# Patient Record
Sex: FEMALE | Race: WHITE
[De-identification: names, ages, dates, MRNs, and addresses within clinical notes are randomized per-mention and may not be internally consistent; named-entity substitution may affect disease eponyms.]

---

## 2018-03-06 ENCOUNTER — HOSPITAL ENCOUNTER (OUTPATIENT)
Dept: HOSPITAL 18 - NAV RAD | Age: 68
Discharge: HOME | End: 2018-03-06
Attending: PHYSICIAN ASSISTANT
Payer: MEDICARE

## 2018-03-06 DIAGNOSIS — M54.5: Primary | ICD-10-CM

## 2018-03-06 DIAGNOSIS — M47.896: ICD-10-CM

## 2018-03-06 DIAGNOSIS — M43.8X6: ICD-10-CM

## 2018-03-06 PROCEDURE — 72100 X-RAY EXAM L-S SPINE 2/3 VWS: CPT

## 2018-03-06 NOTE — RAD
LUMBAR SPINE:

3/6/18

 

Three views. 

 

HISTORY: 

Low back pain. 

 

There is compression deformity involving the L1 vertebra. There is mild anterior wedging. There is sl
ight buckling of the anterior cortex suggesting this may represent an acute or subacute compression i
njury. Loss of anterior height is estimated in the 20% range. Posterior alignment is maintained. Ther
e is no evidence of retropulsion. The other lumbar vertebrae main normal height and alignment. Modera
te degenerative osteophytes are seen throughout. Degenerative disc changes with loss of disc space at
 L4-5 and L5-S1. Facet hypertrophy is noted throughout. 

 

IMPRESSION:  

1.      There is an anterior wedge compression deformity at L1 which may be acute or subacute as disc
ussed above. 

2.      Moderate degenerative changes of the lumbar spine. 

 

POS: [facility]

## 2018-04-28 ENCOUNTER — HOSPITAL ENCOUNTER (EMERGENCY)
Dept: HOSPITAL 18 - NAV ERS | Age: 68
Discharge: ANOTHER HOSPITAL | End: 2018-04-28
Payer: MEDICARE

## 2018-04-28 ENCOUNTER — HOSPITAL ENCOUNTER (OUTPATIENT)
Dept: HOSPITAL 92 - ERS | Age: 68
Setting detail: OBSERVATION
LOS: 1 days | Discharge: NURSING, REHAB OR LONG-TERM CARE | End: 2018-04-29
Attending: NEUROLOGICAL SURGERY | Admitting: NEUROLOGICAL SURGERY
Payer: MEDICARE

## 2018-04-28 DIAGNOSIS — Z88.8: ICD-10-CM

## 2018-04-28 DIAGNOSIS — R62.7: ICD-10-CM

## 2018-04-28 DIAGNOSIS — F17.210: ICD-10-CM

## 2018-04-28 DIAGNOSIS — S32.011A: Primary | ICD-10-CM
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
## 2018-04-28 DIAGNOSIS — Z79.899: ICD-10-CM

## 2018-04-28 DIAGNOSIS — E86.0: ICD-10-CM

## 2018-04-28 DIAGNOSIS — E87.6: ICD-10-CM
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
## 2018-04-28 LAB
ALBUMIN SERPL BCG-MCNC: 3.2 G/DL (ref 3.4–4.8)
ALBUMIN SERPL BCG-MCNC: 3.4 G/DL (ref 3.4–4.8)
ALP SERPL-CCNC: 104 U/L (ref 40–150)
ALP SERPL-CCNC: 99 U/L (ref 40–150)
ALT SERPL W P-5'-P-CCNC: 13 U/L (ref 8–55)
ALT SERPL W P-5'-P-CCNC: 9 U/L (ref 8–55)
ANION GAP SERPL CALC-SCNC: 14 MMOL/L (ref 10–20)
ANION GAP SERPL CALC-SCNC: 20 MMOL/L (ref 10–20)
APTT PPP: 41.7 SEC (ref 22.9–36.1)
AST SERPL-CCNC: 15 U/L (ref 5–34)
AST SERPL-CCNC: 17 U/L (ref 5–34)
BACTERIA UR QL AUTO: (no result) HPF
BASOPHILS # BLD AUTO: 0 THOU/UL (ref 0–0.2)
BASOPHILS # BLD AUTO: 0 THOU/UL (ref 0–0.2)
BASOPHILS NFR BLD AUTO: 0 % (ref 0–1)
BASOPHILS NFR BLD AUTO: 0.5 % (ref 0–1)
BILIRUB SERPL-MCNC: 0.9 MG/DL (ref 0.2–1.2)
BILIRUB SERPL-MCNC: 1 MG/DL (ref 0.2–1.2)
BUN SERPL-MCNC: 11 MG/DL (ref 9.8–20.1)
BUN SERPL-MCNC: 13 MG/DL (ref 9.8–20.1)
CALCIUM SERPL-MCNC: 8.6 MG/DL (ref 7.8–10.44)
CALCIUM SERPL-MCNC: 9.6 MG/DL (ref 7.8–10.44)
CHLORIDE SERPL-SCNC: 93 MMOL/L (ref 98–107)
CHLORIDE SERPL-SCNC: 97 MMOL/L (ref 98–107)
CK MB SERPL-MCNC: 1.5 NG/ML (ref 0–6.6)
CK MB SERPL-MCNC: 1.5 NG/ML (ref 0–6.6)
CK SERPL-CCNC: 46 U/L (ref 29–168)
CK SERPL-CCNC: 64 U/L (ref 29–168)
CO2 SERPL-SCNC: 22 MMOL/L (ref 23–31)
CO2 SERPL-SCNC: 25 MMOL/L (ref 23–31)
CREAT CL PREDICTED SERPL C-G-VRATE: 0 ML/MIN (ref 70–130)
CREAT CL PREDICTED SERPL C-G-VRATE: 0 ML/MIN (ref 70–130)
CRYSTAL-AUWI FLAG: 0 (ref 0–15)
EOSINOPHIL # BLD AUTO: 0 THOU/UL (ref 0–0.7)
EOSINOPHIL # BLD AUTO: 0 THOU/UL (ref 0–0.7)
EOSINOPHIL NFR BLD AUTO: 0.1 % (ref 0–10)
EOSINOPHIL NFR BLD AUTO: 0.1 % (ref 0–10)
GLOBULIN SER CALC-MCNC: 3.2 G/DL (ref 2.4–3.5)
GLOBULIN SER CALC-MCNC: 3.6 G/DL (ref 2.4–3.5)
GLUCOSE SERPL-MCNC: 107 MG/DL (ref 80–115)
GLUCOSE SERPL-MCNC: 108 MG/DL (ref 80–115)
HEV IGM SER QL: 5.6 (ref 0–7.99)
HGB BLD-MCNC: 13.5 G/DL (ref 12–16)
HGB BLD-MCNC: 14.1 G/DL (ref 12–16)
HYALINE CASTS #/AREA URNS LPF: (no result) LPF
INR PPP: 1.2
LYMPHOCYTES # BLD AUTO: 0.4 THOU/UL (ref 1.2–3.4)
LYMPHOCYTES # BLD: 0.6 THOU/UL (ref 1.2–3.4)
LYMPHOCYTES NFR BLD AUTO: 4.3 % (ref 21–51)
LYMPHOCYTES NFR BLD AUTO: 9.7 % (ref 21–51)
MAGNESIUM SERPL-MCNC: 1.8 MG/DL (ref 1.6–2.6)
MCH RBC QN AUTO: 27.5 PG (ref 27–31)
MCH RBC QN AUTO: 30 PG (ref 27–31)
MCV RBC AUTO: 84.4 FL (ref 81–99)
MCV RBC AUTO: 87.1 FL (ref 81–99)
MONOCYTES # BLD AUTO: 0.3 THOU/UL (ref 0.11–0.59)
MONOCYTES # BLD AUTO: 0.4 THOU/UL (ref 0.11–0.59)
MONOCYTES NFR BLD AUTO: 3.8 % (ref 0–10)
MONOCYTES NFR BLD AUTO: 6.3 % (ref 0–10)
NEUTROPHILS # BLD AUTO: 5 THOU/UL (ref 1.4–6.5)
NEUTROPHILS # BLD AUTO: 7.6 THOU/UL (ref 1.4–6.5)
NEUTROPHILS NFR BLD AUTO: 83.9 % (ref 42–75)
NEUTROPHILS NFR BLD AUTO: 91.4 % (ref 42–75)
PATHC CAST-AUWI FLAG: 0.29 (ref 0–2.49)
PLATELET # BLD AUTO: 301 THOU/UL (ref 130–400)
PLATELET # BLD AUTO: 305 THOU/UL (ref 130–400)
POTASSIUM SERPL-SCNC: 2.9 MMOL/L (ref 3.5–5.1)
POTASSIUM SERPL-SCNC: 3.6 MMOL/L (ref 3.5–5.1)
PROTHROMBIN TIME: 15.1 SEC (ref 12–14.7)
RBC # BLD AUTO: 4.51 MILL/UL (ref 4.2–5.4)
RBC # BLD AUTO: 5.11 MILL/UL (ref 4.2–5.4)
RBC UR QL AUTO: (no result) HPF (ref 0–3)
SODIUM SERPL-SCNC: 131 MMOL/L (ref 136–145)
SODIUM SERPL-SCNC: 133 MMOL/L (ref 136–145)
SP GR UR STRIP: 1.01 (ref 1–1.04)
SPERM-AUWI FLAG: 0 (ref 0–9.9)
TROPONIN I SERPL DL<=0.01 NG/ML-MCNC: (no result) NG/ML (ref ?–0.03)
TROPONIN I SERPL DL<=0.01 NG/ML-MCNC: (no result) NG/ML (ref ?–0.03)
WBC # BLD AUTO: 5.9 THOU/UL (ref 4.8–10.8)
WBC # BLD AUTO: 8.3 THOU/UL (ref 4.8–10.8)
WBC UR QL AUTO: (no result) HPF (ref 0–3)
YEAST-AUWI FLAG: 0 (ref 0–25)

## 2018-04-28 PROCEDURE — 97530 THERAPEUTIC ACTIVITIES: CPT

## 2018-04-28 PROCEDURE — 51701 INSERT BLADDER CATHETER: CPT

## 2018-04-28 PROCEDURE — A4353 INTERMITTENT URINARY CATH: HCPCS

## 2018-04-28 PROCEDURE — 82553 CREATINE MB FRACTION: CPT

## 2018-04-28 PROCEDURE — 93005 ELECTROCARDIOGRAM TRACING: CPT

## 2018-04-28 PROCEDURE — 72128 CT CHEST SPINE W/O DYE: CPT

## 2018-04-28 PROCEDURE — 36415 COLL VENOUS BLD VENIPUNCTURE: CPT

## 2018-04-28 PROCEDURE — 97139 UNLISTED THERAPEUTIC PX: CPT

## 2018-04-28 PROCEDURE — 85730 THROMBOPLASTIN TIME PARTIAL: CPT

## 2018-04-28 PROCEDURE — 85610 PROTHROMBIN TIME: CPT

## 2018-04-28 PROCEDURE — 96360 HYDRATION IV INFUSION INIT: CPT

## 2018-04-28 PROCEDURE — 96374 THER/PROPH/DIAG INJ IV PUSH: CPT

## 2018-04-28 PROCEDURE — 73630 X-RAY EXAM OF FOOT: CPT

## 2018-04-28 PROCEDURE — 99285 EMERGENCY DEPT VISIT HI MDM: CPT

## 2018-04-28 PROCEDURE — 96375 TX/PRO/DX INJ NEW DRUG ADDON: CPT

## 2018-04-28 PROCEDURE — G0378 HOSPITAL OBSERVATION PER HR: HCPCS

## 2018-04-28 PROCEDURE — 82550 ASSAY OF CK (CPK): CPT

## 2018-04-28 PROCEDURE — 96361 HYDRATE IV INFUSION ADD-ON: CPT

## 2018-04-28 PROCEDURE — 84100 ASSAY OF PHOSPHORUS: CPT

## 2018-04-28 PROCEDURE — G0390 TRAUMA RESPONS W/HOSP CRITI: HCPCS

## 2018-04-28 PROCEDURE — 81003 URINALYSIS AUTO W/O SCOPE: CPT

## 2018-04-28 PROCEDURE — 70450 CT HEAD/BRAIN W/O DYE: CPT

## 2018-04-28 PROCEDURE — 99406 BEHAV CHNG SMOKING 3-10 MIN: CPT

## 2018-04-28 PROCEDURE — 81015 MICROSCOPIC EXAM OF URINE: CPT

## 2018-04-28 PROCEDURE — 72131 CT LUMBAR SPINE W/O DYE: CPT

## 2018-04-28 PROCEDURE — 96372 THER/PROPH/DIAG INJ SC/IM: CPT

## 2018-04-28 PROCEDURE — 84484 ASSAY OF TROPONIN QUANT: CPT

## 2018-04-28 PROCEDURE — 80053 COMPREHEN METABOLIC PANEL: CPT

## 2018-04-28 PROCEDURE — 85025 COMPLETE CBC W/AUTO DIFF WBC: CPT

## 2018-04-28 PROCEDURE — 96366 THER/PROPH/DIAG IV INF ADDON: CPT

## 2018-04-28 PROCEDURE — 83735 ASSAY OF MAGNESIUM: CPT

## 2018-04-28 PROCEDURE — 72148 MRI LUMBAR SPINE W/O DYE: CPT

## 2018-04-28 PROCEDURE — 84132 ASSAY OF SERUM POTASSIUM: CPT

## 2018-04-28 PROCEDURE — 71045 X-RAY EXAM CHEST 1 VIEW: CPT

## 2018-04-28 PROCEDURE — 96365 THER/PROPH/DIAG IV INF INIT: CPT

## 2018-04-28 NOTE — CT
CT BRAIN WITHOUT CONTRAST:

4/28/18

 

HISTORY: 

Altered mental status, fall. 

 

FINDINGS:  

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
shelly infarction. No evidence of acute infarct, hemorrhage, midline shift or abnormal extra-axial fluid
 collections are seen. The ventricular size is appropriate and the basilar cisterns patent. The bony 
calvarium is intact. The visualized paranasal sinuses and mastoid air cells are well aerated. 

 

IMPRESSION:  

No CT evidence of acute intracranial process. 

 

POS: [facility]

## 2018-04-28 NOTE — RAD
RIGHT FOOT THREE VIEWS:

4/28/18

 

HISTORY: 

Fall, right foot pain.

 

FINDINGS/IMPRESSION:    

There is an old healed fracture of the shaft of the fifth metatarsal. No acute fracture or dislocatio
n is identified. A plantar calcaneal spur is  present. 

 

[facility]: [facility]

## 2018-04-28 NOTE — RAD
PORTABLE CHEST ONE VIEW:

4/28/18 at 6:02 p.m.

 

HISTORY: 

Altered mental status.

 

FINDINGS:  

The heart size is normal. The aorta is tortuous. The lungs are well expanded without confluent areas 
of consolidation, pneumothoraces or pleural effusions. 

 

IMPRESSION:  

No acute process. 

 

POS: [facility]

## 2018-04-28 NOTE — CT
CT LUMBAR SPINE    

4/28/18

 

PROVIDED CLINICAL HISTORY:  

Back pain status post injury.

 

FINDINGS:  

There is burst fracture of L1 with loss of about one half of vertebral body height. There is retropul
shelly of bony fragments such that there is effacement of the ventral aspects of the spinal canal produ
cing mild to moderate central canal narrowing. There is surrounding paravertebral hematoma anteriorly
. Vertebral body heights appear otherwise preserved. No additional fracture is evident. Multilevel crystal
mbar degenerative changes are seen, mild. Vascular calcifications noted involving the abdominal aorta
.

 

IMPRESSION:  

Recent L1 burst fracture as above. 

 

 

 

POS: [facility]

## 2018-04-29 VITALS — TEMPERATURE: 97.4 F | DIASTOLIC BLOOD PRESSURE: 76 MMHG | SYSTOLIC BLOOD PRESSURE: 172 MMHG

## 2018-04-29 LAB
ALBUMIN SERPL BCG-MCNC: 3.1 G/DL (ref 3.4–4.8)
ALP SERPL-CCNC: 89 U/L (ref 40–150)
ALT SERPL W P-5'-P-CCNC: 9 U/L (ref 8–55)
ANION GAP SERPL CALC-SCNC: 12 MMOL/L (ref 10–20)
AST SERPL-CCNC: 14 U/L (ref 5–34)
BASOPHILS # BLD AUTO: 0 THOU/UL (ref 0–0.2)
BASOPHILS NFR BLD AUTO: 0 % (ref 0–1)
BILIRUB SERPL-MCNC: 1.1 MG/DL (ref 0.2–1.2)
BUN SERPL-MCNC: 13 MG/DL (ref 9.8–20.1)
CALCIUM SERPL-MCNC: 8.8 MG/DL (ref 7.8–10.44)
CHLORIDE SERPL-SCNC: 99 MMOL/L (ref 98–107)
CO2 SERPL-SCNC: 25 MMOL/L (ref 23–31)
CREAT CL PREDICTED SERPL C-G-VRATE: 68 ML/MIN (ref 70–130)
EOSINOPHIL # BLD AUTO: 0.1 THOU/UL (ref 0–0.7)
EOSINOPHIL NFR BLD AUTO: 1.6 % (ref 0–10)
GLOBULIN SER CALC-MCNC: 2.9 G/DL (ref 2.4–3.5)
GLUCOSE SERPL-MCNC: 92 MG/DL (ref 80–115)
HGB BLD-MCNC: 12.9 G/DL (ref 12–16)
LYMPHOCYTES # BLD: 1 THOU/UL (ref 1.2–3.4)
LYMPHOCYTES NFR BLD AUTO: 17.6 % (ref 21–51)
MAGNESIUM SERPL-MCNC: 1.8 MG/DL (ref 1.6–2.6)
MCH RBC QN AUTO: 29.7 PG (ref 27–31)
MCV RBC AUTO: 88 FL (ref 81–99)
MONOCYTES # BLD AUTO: 0.6 THOU/UL (ref 0.11–0.59)
MONOCYTES NFR BLD AUTO: 10.3 % (ref 0–10)
NEUTROPHILS # BLD AUTO: 4 THOU/UL (ref 1.4–6.5)
NEUTROPHILS NFR BLD AUTO: 70.5 % (ref 42–75)
PLATELET # BLD AUTO: 290 THOU/UL (ref 130–400)
POTASSIUM SERPL-SCNC: 2.7 MMOL/L (ref 3.5–5.1)
POTASSIUM SERPL-SCNC: 2.8 MMOL/L (ref 3.5–5.1)
RBC # BLD AUTO: 4.36 MILL/UL (ref 4.2–5.4)
SODIUM SERPL-SCNC: 133 MMOL/L (ref 136–145)
WBC # BLD AUTO: 5.6 THOU/UL (ref 4.8–10.8)

## 2018-04-29 RX ADMIN — HYDROCODONE BITARTRATE AND ACETAMINOPHEN PRN TAB: 5; 325 TABLET ORAL at 20:45

## 2018-04-29 RX ADMIN — POTASSIUM CHLORIDE AND SODIUM CHLORIDE SCH: 900; 150 INJECTION, SOLUTION INTRAVENOUS at 20:05

## 2018-04-29 RX ADMIN — HYDROCODONE BITARTRATE AND ACETAMINOPHEN PRN TAB: 5; 325 TABLET ORAL at 16:47

## 2018-04-29 RX ADMIN — HYDROCODONE BITARTRATE AND ACETAMINOPHEN PRN TAB: 5; 325 TABLET ORAL at 02:58

## 2018-04-29 RX ADMIN — POTASSIUM CHLORIDE AND SODIUM CHLORIDE SCH MLS: 900; 150 INJECTION, SOLUTION INTRAVENOUS at 07:38

## 2018-04-29 NOTE — HP
Fredo Vasques PA-C, dictating for Vasiliy Walden MD

 

This is a 50-minute initial patient consult with greater than 50% of the exam was spent counseling an
d coordinating patient's care.  Remainder of the exam was spent in review of patient's medical record
s and appropriate studies.

 

CHIEF COMPLAINT:  Status post fall out of bed with L1 burst fracture.

 

HISTORY OF PRESENT ILLNESS:  Ms. Martinez is a 67-year-old female who presented with the above complaints
.  Apparently, the patient fell in early 03/2018 sustained a mild L1 compression fracture, it was rah
luated in an outpatient setting by myself.  At the time of the office visit, the patient was complain
ing of debilitating midline lumbar spine pain and states at this time, her pain is worse since slide 
out of bed earlier this morning.  She was referred for CT scan of the lumbar spine, upright static an
d dynamic x-rays, and lumbar spine MRI without contrast due to unknown reasons, was unable to complet
e it on an outpatient basis.  Given her fall out of bed, she presented to the emergency room in [facility].  CT was obtained that showed progression of her L1 compression fracture in to an L1 burst fracture
.  The patient denies any radicular complaints.  She does note some pain into the right lateral foot,
 but has a previous fracture in that foot.  She has no numbness or tingling into the legs.  She state
s that she does occasionally take aspirin for pain control, although has not taken this in the past 3
-4 weeks.  She also has been wheelchair bound due to debilitating low back pain.  She was also referr
ed for a clamshell TLSO brace, which she got a few weeks ago, but states she has not been wearing it 
as she has not been ambulatory and was told that she only needed to wear it when out of bed.  She aga
in has had difficulty with completing ADLs and appears to be overall malnourished.  She is also a cur
rent smoker.  The patient notes intermittent urinary retention that states this has been happening ov
er the past several months to years and this has not changed since her recent falls.

 

PHYSICAL EXAMINATION:

GENERAL:  The patient is awake, alert, and appropriate.  GCS currently is 15.

HEENT:  Pupils are equal, round, and reactive bilaterally.

NEUROLOGIC:  She has full strength in the bilateral upper extremities and trace weakness in the right
 lower extremity with straight leg raise.  She overall has good strength in the bilateral lower extre
mities.  She has intact sensation in the bilateral lower extremities.  She does appear again overall 
deconditioned, but in my opinion, has good strength in the bilateral lower extremities.  She has no w
orrisome myelopathic features on exam including negative Sandeep's bilaterally, no increased tone.  
Again, the patient appears malnourished, does smell of cigarette smoke with poor dentition.

 

IMPRESSION AND DIAGNOSES:

1.  Multiple falls with progression of L1 compression fracture to L1 burst fracture on radiographs.

2.  Chronic low back pain with worsening after slight out of bed.

 

PLAN:  I have discussed the patient's case and imaging with Dr. Walden.  At this time, we will order 
a stat MRI of the lumbar spine without contrast.  The patient did bring her a [employer] brace, b
ut at this time, I would like her to be on bed rest until imaging is completed.  We also like her to 
be n.p.o.  We will again review the results of the MRI, but hope tonight to take the patient to [facility].  We will order repeat labs, but I have also asked _____ to help manage the patient's medical issu
es.  Hopefully, we can get the patient's pain under good control, get her more mobile in the next few
 days.  Again, we will check back on the results of the lumbar spine MRI.  Based on the results of th
e MRI should she require and consideration will be given to a posterior lumbar fusion, but again we w
ould like to avoid this given the patient's comorbidities.  Please call with any questions or changes
 in patient's neurologic status, otherwise she will be admitted to the surgical floor, again on bed r
est.

## 2018-04-29 NOTE — CT
CT THORACIC SPINE NONCONTRAST:

 

DATE: 4/28/18.

TIME: 9:06 p.m.

 

HISTORY: 

A 67-year-old female with persistent posttraumatic thoracic spine pain after fall approximately 3-6 w
eeks ago.

 

COMPARISON: 

None.

 

FINDINGS: 

There is an approximately 3 x 1.5 x 2.5 cm right adrenal mass with density of approximately 6 HU, con
sistent with a benign adrenal adenoma.  No pleural effusion.  The bilateral lower lobe pulmonary pare
nchyma is grossly clear.  There are 12 paired ribs.

 

There is broad indentation of the inferior end plate of the T7 vertebral body, resulting in anterior 
wedge compression deformity, with a maximum of approximately 45% loss of height anteriorly.  There is
 slight bony retropulsion of the inferior end plate, which technically makes this a burst fracture.  
There is sclerosis of the entire T7 vertebral body, and there are no linear lucencies.  There is no e
daniela in the prevertebral space at this level.  The compression fracture of the inferior end plate is 
associated with large irregular defects protruding far into the body of the T7 vertebral body, consis
tent with large Schmorl's nodes.  The bony retropulsion causes only a very mild degree of central spi
nal canal stenosis at that level.

 

There is a more recent and more severe burst fracture of T12 vertebral body, with more significant jacek
ny retropulsion into the spinal canal, multiple fracture lucencies, and edema throughout the perivert
ebral space, especially the prevertebral space.  Please see separate report of the lumbar spine MRI. 
The rest of the thoracic vertebral body heights are maintained.  

 

IMPRESSION: 

1.  Old compression/burst fracture of T7 vertebral body.

 

2.  Recent, severe burst fracture of L1 vertebra, with significant bony retropulsion, and edema of th
e prevertebral space at that location.  See separate report of the lumbar spine MRI performed on the 
same day.

 

POS: [facility]

## 2018-04-29 NOTE — MRI
MRI LUMBAR SPINE NONCONTRAST:

 

DATE: 4/28/18.

 

HISTORY:

A 67-year-old female with persistent posttraumatic low back pain after fall 3-6 weeks ago.

 

COMPARISON:

Plain radiograph of 3/6/18.  Concurrent CT of 4/28/18 at 2:28 p.m.  No prior MRIs. 

 

FINDINGS:

The axial images are significantly degraded by patient motion.  There are 5 lumbar-type vertebrae.  O
n the plain radiograph of 3/26/18, there was mild broad depression of the superior end plate of L1 (a
nterior wedge compression fracture deformity), with approximately 20-30% loss of height.  There has b
een further collapse of the L1 vertebral body with broad indentations of both the superior and inferi
or end plates now, resulting in approximately 60% maximum loss of height centrally.  There is also no
w significant bony retropulsion into the spinal canal which posteriorly displaces the lower portion o
f the spinal cord, and causes at a moderate-severe degree of central spinal canal stenosis.  No cord 
edema identified. There is extensive bone marrow signal abnormality (T1 hypointensity) throughout the
 L1 vertebral body, with mixed hyperintense signal on STIR.

 

Additionally, there is an irregularly-shaped, well circumscribed very T2 hyperintense (and T1 hypoint
ense) area within the central portion of the collapsed vertebral body (fluid signal).  

 

Furthermore, there is edema throughout the perivertebral soft tissues (both the prevertebral space an
d posterior paraspinal musculature), which extends superiorly a short distance, and especially inferi
mckayla by several levels.  

 

There is a thin plate-like broad band of bone marrow edema involving the posterior aspect of the T12 
vertebral body.  There is no loss of height of T12, L2, L3, L4, or L5 vertebral bodies.  No major spo
ndylolisthesis.  There is bone marrow edema of the end plates anteriorly at L5-S1, which involves an 
anterior bridging osteophyte (that protrudes into the prevertebral space).  The T2 hyperintense signa
l also extends to involve the anterior portion of the L5-S1 disk space.  This is favored to represent
 Modic type I changes plus annular fissures of the anterior aspect of disk space.  The alternative wo
uld be diskitis-osteomyelitis versus recent traumatic contusion without displacement.  

 

There is multilevel mild and moderate degenerative disk disease and degenerative facet disease.  The 
findings by individual levels are as follows:

 

T12-L1:  Moderate right degenerative facet changes.  No high-grade neural foraminal stenosis.  Mild c
entral spinal canal stenosis.  Moderate to severe thecal sac stenosis due to the addition of prominen
t posterior epidural fat pad.

 

L1-2:  Conus medullaris terminates at this level.  Moderate bilateral degenerative facet hypertrophy.
  Moderate to severe central spinal canal stenosis.  Mild to moderate bilateral neural foraminal sten
osis.

 

L2-3:  Moderate to severe bilateral degenerative facet hypertrophy.  Diffuse disk bulge.  Moderate ce
ntral spinal canal stenosis.  Mild to moderate bilateral neural foraminal stenosis.

 

L3-4:  Moderate bilateral degenerative facet hypertrophy.  Mild diffuse disk bulge.  No central steno
sis.  Moderate bilateral neural foraminal stenosis.

 

L4-5:  Moderate bilateral degenerative facet changes.  Diffuse disk bulge.  No central spinal canal s
tenosis.  Moderate to severe bilateral neural foraminal stenosis.

 

L5-S1:  Moderate bilateral degenerative facet changes.  Moderate to severe right neural foraminal igor
nosis.  Moderate left neural foraminal stenosis.  No central stenosis.  Diffuse disk bulge.

 

IMPRESSION:

1.  Recent burst fracture of L1, with extensive bone marrow signal abnormality (marrow edema; but can
not rule out neoplasm) involving the entire collapsed vertebral body, an intraosseous fluid collectio
n (hematoma?) within the collapsed vertebra body, edema throughout the perivertebral spaces extending
 inferiorly several levels, and bony retropulsion impinging on the lower portion of the spinal cord, 
causing moderate to severe central spinal canal stenosis.  

 

2.  The burst fracture represents further collapse of the previously mild compression fracture demons
trated on 3/6/18.  

 

3.  This is definitely a posttraumatic burst fracture.  Whether or not there is a component of pathol
ogic fracture due to underlying disease of the L1 vertebral body is uncertain.  Therefore, serial fol
lowup MRIs of the lumbar spine are recommended, beginning in 3 months.

 

4.  Multilevel neural foraminal stenosis.

 

5.  Lumbar spondylosis with multilevel degenerative disk disease.

 

 

KASSY TYLER

 

POS: [facility]

## 2018-04-29 NOTE — PDOC.PN
- Subjective


Encounter Start Date: 04/29/18


Encounter Start Time: 15:26





Pt seen for management of medical comorbidities including hypokalemia.  Denies 

chest pain or shortness of breath, has back pain.





- Objective


Resuscitation Status: 


 











Resuscitation Status           FULL:Full Resuscitation














MAR Reviewed: Yes


Vital Signs & Weight: 


 Vital Signs (12 hours)











  Temp Pulse Resp BP BP Pulse Ox


 


 04/29/18 13:00  98.7 F  67  15  145/82 H   93 L


 


 04/29/18 12:00  98.7 F  67  15   145/88 H  93 L


 


 04/29/18 08:00  98.2 F  91  14  127/75  127/75  95


 


 04/29/18 03:58  98.4 F  99  14   132/81  96


 


 04/29/18 03:48       99








 Weight











Admit Weight                   134 lb 14.766 oz


 


Weight                         134 lb 14.766 oz














I&O: 


 











 04/28/18 04/29/18 04/30/18





 06:59 06:59 06:59


 


Intake Total   520


 


Balance   520











Result Diagrams: 


 04/29/18 05:18





 04/29/18 13:50





Phys Exam





- Physical Examination


Constitutional: NAD


HEENT: moist MMs


Neck: supple


Respiratory: clear to auscultation bilateral


Cardiovascular: RRR


Gastrointestinal: soft


Neurological: moves all 4 limbs


Psychiatric: normal affect





Dx/Plan


(1) Hypokalemia


Code(s): E87.6 - HYPOKALEMIA   Status: Acute   Comment: Replace potassium, 

recheck potassium level   





(2) Vertebral fracture


Code(s): QKB5053 -    Status: Acute   Comment: pt under care of neurosurgery 

service   





(3) Tobacco abuse


Code(s): Z72.0 - TOBACCO USE   Status: Chronic   Comment: Counseled pt re: 

smoking cessation.  Pt does not want nicotine replacement therapy since she had 

palpitations when she tried nicotine gum.   





- Plan





* .








Review of Systems





- Review of Systems


Constitutional: negative: fever, chills, sweats, weakness, malaise


Respiratory: negative: Cough, Shortness of Breath, SOB with Excertion, 

Pleuritic Pain, Wheezing


Cardiovascular: negative: chest pain, palpitations, orthopnea, paroxysmal 

nocturnal dyspnea, edema, light headedness


Musculoskeletal: Back Pain





- Medications/Allergies


Allergies/Adverse Reactions: 


 Allergies











Allergy/AdvReac Type Severity Reaction Status Date / Time


 


diphenhydramine Allergy   Verified 04/28/18 23:35





[From Benadryl]     











Medications: 


 Current Medications





Acetaminophen (Tylenol)  650 mg PO Q4H PRN


   PRN Reason: Headache/Fever or Pain


Hydrocodone Bitart/Acetaminophen (Norco 5/325)  1 tab PO Q4H PRN


   PRN Reason: Moderate Pain (4-6)


   Last Admin: 04/29/18 02:58 Dose:  1 tab


Al Hydroxide/Mg Hydroxide (Maalox)  30 ml PO Q6H PRN


   PRN Reason: Heartburn  or Indigestion


Bisacodyl (Dulcolax)  10 mg PO DAILYPRN PRN


   PRN Reason: Constipation


Calcium Carbonate (Tums)  1,000 mg PO Q4H PRN


   PRN Reason: Heartburn  or Indigestion


Docusate Sodium (Colace)  100 mg PO BID [facility]


   Last Admin: 04/29/18 09:00 Dose:  100 mg


Potassium Chloride/Sodium Chloride (Ns 0.9% W/ 20 Meq Kcl)  1,000 ml in 1,000 

mls @ 75 mls/hr IV .[id number] [facility]


   Last Admin: 04/29/18 07:38 Dose:  1,000 mls


Magnesium Hydroxide (Milk Of Magnesium)  30 ml PO DAILYPRN PRN


   PRN Reason: Constipation


Morphine Sulfate (Morphine)  2 mg SLOW IVP Q2HR [facility]


Potassium Chloride (Klor-Con)  40 meq PO BID[facility]


   Last Admin: 04/29/18 09:00 Dose:  40 meq


Senna (Senokot)  2 tab PO HSPRN PRN


   PRN Reason: Constipation


Sodium Biphosphate/Sodium Phosphate (Fleet Enema)  133 ml [facility] ONE PRN


   PRN Reason: Constipation


   Stop: 05/05/18 19:18


Tizanidine HCl (Zanaflex)  4 mg PO TID PRN


   PRN Reason: Muscle Spasm


   Last Admin: 04/29/18 02:58 Dose:  4 mg

## 2018-04-30 NOTE — PRG
DATE OF SERVICE:  04/29/2018

 

This is a 30-minute initial hospital visit note, in which 30 minutes were spent in review of the imag
ing record, evaluation, examination of the patient, and formulation of a plan.  Greater than 50% of t
he time spent in counseling.

 

CHIEF COMPLAINT:  Progression of L1 burst fracture.

 

HISTORY OF PRESENT ILLNESS:  I reviewed the notes of my colleague Fredo Vasques PA-C and agree with
 its content.  Ms. Martinez is a 67-year-old woman who appears to be deconditioned.  She presented to our
 clinic earlier in March for concern of an L1 fracture.  Consistent with a burst with mild involvemen
t of the anterior middle column, she evidently fell on to her buttocks yesterday and presented with i
ncreased pain as such a CT and MRI were performed, which demonstrates progression in the L1 burst fra
cture with mild-to-moderate canal stenosis.  There is still CSF, however, ventral and dorsal to the c
onus and the patient has no evidence of radicular pain.  Again, she has limited knee immobilization m
ainly by the pain.  We had arranged _____ for clinic evaluation for her to get a TLSO clamp showed un
clear to me whether she has been wearing this consistently as we have recommended.  Nevertheless, she
 has been staying with family and I suspect, she needs more aggressive rehabilitation.

 

PHYSICAL EXAM:  On exam, she is alert, appropriate again she does appear quite deconditioned.  She do
es move all extremities and myotomes to command with no apparent motor deficits.  She has a brace, bu
t it is on the counter next to her bed.  This should be worn whenever the patient is out of bed and l
ikely be for 3 months.

 

IMPRESSION AND PLAN:  I have let the patient know that I would strongly recommend against internal st
abilization with screw and willow construct.  I think, the patient's bone quality is likely poor further
.  I do not think, we have given an adequate trial of external bracing for the patient.  I also will 
recommend inpatient physical therapy, as I think this would best benefit of the patient.  She is in a
greement.  We will pursue this in that regard.

 

DIAGNOSIS:  L1 burst fracture.

## 2018-05-01 NOTE — DIS
Fredo Vasques PA-C dictating for Vasiliy Walden MD

 

DATE OF ADMISSION:  04/28/2018

 

DATE OF DISCHARGE:  04/29/2018

 

DISCHARGE DIAGNOSES:

1.  Low back pain.

2.  L1 burst fracture.

3.  Hypokalemia.

4.  Tobacco abuse.

 

HOSPITAL COURSE:  Ms. Martinez was admitted from [facility] on 04/28/2018 as a transfer fr
om outside facility in regard to a significant low back pain with L1 burst fracture.  She was provide
d a clamshell TLSO brace and her medical colleagues helped to improve her hypokalemia.  She was havin
g difficulties with ADLs given her back pain and limited availability of help at home, so she was abl
e to be discharged to skilled nursing facility or inpatient rehabilitation.  At the time of discharge
, she has good strength in bilateral lower extremities and improvement in her low back pain with pain
 medications and her clamshell TLSO brace.  Appropriate outpatient followup appointments were schedul
ed and appropriate outpatient education was provided.  At the time of discharge, the patient was impr
salty in her pain complaints and pleased that she was going to inpatient rehabilitation in order to ge
t help with completing ADLs.  Ample opportunity was given to the patient to discuss her questions and
 concerns prior to discharge.

## 2018-05-02 ENCOUNTER — HOSPITAL ENCOUNTER (INPATIENT)
Dept: HOSPITAL 92 - ERS | Age: 68
LOS: 1 days | Discharge: NURSING, REHAB OR LONG-TERM CARE | DRG: 640 | End: 2018-05-03
Attending: INTERNAL MEDICINE | Admitting: INTERNAL MEDICINE
Payer: MEDICARE

## 2018-05-02 DIAGNOSIS — E86.0: Primary | ICD-10-CM

## 2018-05-02 DIAGNOSIS — Z79.899: ICD-10-CM

## 2018-05-02 DIAGNOSIS — E87.1: ICD-10-CM

## 2018-05-02 DIAGNOSIS — N18.2: ICD-10-CM

## 2018-05-02 DIAGNOSIS — N39.0: ICD-10-CM

## 2018-05-02 DIAGNOSIS — Z88.8: ICD-10-CM

## 2018-05-02 DIAGNOSIS — F17.210: ICD-10-CM

## 2018-05-02 DIAGNOSIS — G92: ICD-10-CM

## 2018-05-02 DIAGNOSIS — I12.9: ICD-10-CM

## 2018-05-02 LAB
ALBUMIN SERPL BCG-MCNC: 3.5 G/DL (ref 3.4–4.8)
ALP SERPL-CCNC: 135 U/L (ref 40–150)
ALT SERPL W P-5'-P-CCNC: 14 U/L (ref 8–55)
ANION GAP SERPL CALC-SCNC: 19 MMOL/L (ref 10–20)
APAP SERPL-MCNC: 7 MCG/ML (ref 10–30)
AST SERPL-CCNC: 26 U/L (ref 5–34)
BACTERIA UR QL AUTO: (no result) HPF
BASOPHILS # BLD AUTO: 0 THOU/UL (ref 0–0.2)
BASOPHILS NFR BLD AUTO: 0.3 % (ref 0–1)
BILIRUB SERPL-MCNC: 1 MG/DL (ref 0.2–1.2)
BUN SERPL-MCNC: 13 MG/DL (ref 9.8–20.1)
CALCIUM SERPL-MCNC: 9.5 MG/DL (ref 7.8–10.44)
CHLORIDE SERPL-SCNC: 98 MMOL/L (ref 98–107)
CO2 SERPL-SCNC: 20 MMOL/L (ref 23–31)
CREAT CL PREDICTED SERPL C-G-VRATE: 0 ML/MIN (ref 70–130)
CRYSTAL-AUWI FLAG: 0 (ref 0–15)
DRUG SCREEN CUTOFF: (no result)
EOSINOPHIL # BLD AUTO: 0 THOU/UL (ref 0–0.7)
EOSINOPHIL NFR BLD AUTO: 0.2 % (ref 0–10)
GLOBULIN SER CALC-MCNC: 3.7 G/DL (ref 2.4–3.5)
GLUCOSE SERPL-MCNC: 102 MG/DL (ref 80–115)
HEV IGM SER QL: 0.2 (ref 0–7.99)
HGB BLD-MCNC: 14.4 G/DL (ref 12–16)
HYALINE CASTS #/AREA URNS LPF: (no result) LPF
LIPASE SERPL-CCNC: 38 U/L (ref 8–78)
LYMPHOCYTES # BLD: 0.7 THOU/UL (ref 1.2–3.4)
LYMPHOCYTES NFR BLD AUTO: 8.2 % (ref 21–51)
MCH RBC QN AUTO: 29.5 PG (ref 27–31)
MCV RBC AUTO: 87.8 FL (ref 81–99)
MEDTOX CONTROL LINE VALID?: (no result)
MEDTOX READER #: (no result)
MONOCYTES # BLD AUTO: 0.3 THOU/UL (ref 0.11–0.59)
MONOCYTES NFR BLD AUTO: 3.8 % (ref 0–10)
NEUTROPHILS # BLD AUTO: 7.6 THOU/UL (ref 1.4–6.5)
NEUTROPHILS NFR BLD AUTO: 87.4 % (ref 42–75)
PATHC CAST-AUWI FLAG: 1.3 (ref 0–2.49)
PLATELET # BLD AUTO: 307 THOU/UL (ref 130–400)
POTASSIUM SERPL-SCNC: 4.2 MMOL/L (ref 3.5–5.1)
RBC # BLD AUTO: 4.88 MILL/UL (ref 4.2–5.4)
RBC UR QL AUTO: (no result) HPF (ref 0–3)
SALICYLATES SERPL-MCNC: (no result) MG/DL (ref 15–30)
SODIUM SERPL-SCNC: 133 MMOL/L (ref 136–145)
SP GR UR STRIP: 1.02 (ref 1–1.04)
SPERM-AUWI FLAG: 0 (ref 0–9.9)
WBC # BLD AUTO: 8.7 THOU/UL (ref 4.8–10.8)
YEAST-AUWI FLAG: 0 (ref 0–25)

## 2018-05-02 PROCEDURE — 80307 DRUG TEST PRSMV CHEM ANLYZR: CPT

## 2018-05-02 PROCEDURE — 71045 X-RAY EXAM CHEST 1 VIEW: CPT

## 2018-05-02 PROCEDURE — 80053 COMPREHEN METABOLIC PANEL: CPT

## 2018-05-02 PROCEDURE — 76705 ECHO EXAM OF ABDOMEN: CPT

## 2018-05-02 PROCEDURE — 82553 CREATINE MB FRACTION: CPT

## 2018-05-02 PROCEDURE — 87040 BLOOD CULTURE FOR BACTERIA: CPT

## 2018-05-02 PROCEDURE — 85610 PROTHROMBIN TIME: CPT

## 2018-05-02 PROCEDURE — A4216 STERILE WATER/SALINE, 10 ML: HCPCS

## 2018-05-02 PROCEDURE — 81003 URINALYSIS AUTO W/O SCOPE: CPT

## 2018-05-02 PROCEDURE — 36415 COLL VENOUS BLD VENIPUNCTURE: CPT

## 2018-05-02 PROCEDURE — 93005 ELECTROCARDIOGRAM TRACING: CPT

## 2018-05-02 PROCEDURE — 85025 COMPLETE CBC W/AUTO DIFF WBC: CPT

## 2018-05-02 PROCEDURE — 83690 ASSAY OF LIPASE: CPT

## 2018-05-02 PROCEDURE — 85730 THROMBOPLASTIN TIME PARTIAL: CPT

## 2018-05-02 PROCEDURE — 74177 CT ABD & PELVIS W/CONTRAST: CPT

## 2018-05-02 PROCEDURE — 96365 THER/PROPH/DIAG IV INF INIT: CPT

## 2018-05-02 PROCEDURE — 87077 CULTURE AEROBIC IDENTIFY: CPT

## 2018-05-02 PROCEDURE — 84484 ASSAY OF TROPONIN QUANT: CPT

## 2018-05-02 PROCEDURE — 87086 URINE CULTURE/COLONY COUNT: CPT

## 2018-05-02 PROCEDURE — 82140 ASSAY OF AMMONIA: CPT

## 2018-05-02 PROCEDURE — 70450 CT HEAD/BRAIN W/O DYE: CPT

## 2018-05-02 PROCEDURE — A4353 INTERMITTENT URINARY CATH: HCPCS

## 2018-05-02 PROCEDURE — 84443 ASSAY THYROID STIM HORMONE: CPT

## 2018-05-02 PROCEDURE — 80306 DRUG TEST PRSMV INSTRMNT: CPT

## 2018-05-02 PROCEDURE — 81015 MICROSCOPIC EXAM OF URINE: CPT

## 2018-05-02 PROCEDURE — 96375 TX/PRO/DX INJ NEW DRUG ADDON: CPT

## 2018-05-02 PROCEDURE — 87186 SC STD MICRODIL/AGAR DIL: CPT

## 2018-05-02 PROCEDURE — 51701 INSERT BLADDER CATHETER: CPT

## 2018-05-02 NOTE — RAD
FRONTAL RADIOGRAPH CHEST

PORTABLE SEMIUPRIGHT

5/2/18

 

COMPARISON:  

4/28/18

 

HISTORY: 

Nonverbal. 

 

FINDINGS:  

There is mild increased linear interstitial density noted bilaterally. Heart and mediastinal contours
 are stable. No lobar consolidation or alveolar edema. 

 

IMPRESSION:  

No acute findings. 

 

POS: [facility]

## 2018-05-02 NOTE — CT
HEAD CT WITHOUT CONTRAST

5/2/18

 

COMPARISON:  

4/28/18

 

HISTORY: 

Altered mental status, nonverbal.

 

TECHNIQUE:  

Serial axial CT imaging at 5 mm intervals from vertex through skull base without contrast. 

 

FINDINGS:  

The study is slightly limited secondary to persistent patient motion artifact. The imaged paranasal s
inuses and mastoid air cells are well aerated. There is no displaced calvarial fracture. 

 

There is stable mild diffuse cerebral volume loss. There is periventricular hypodensity, right greate
r than left, evidence of significant stable small vessel disease. No acute findings are seen. 

 

IMPRESSION:  

Stable head CT as described above.

 

POS: [facility]

## 2018-05-03 VITALS — DIASTOLIC BLOOD PRESSURE: 77 MMHG | SYSTOLIC BLOOD PRESSURE: 154 MMHG | TEMPERATURE: 97.5 F

## 2018-05-03 LAB
APTT PPP: 35 SEC (ref 22.9–36.1)
CK MB SERPL-MCNC: 1.6 NG/ML (ref 0–6.6)
CK MB SERPL-MCNC: 1.7 NG/ML (ref 0–6.6)
INR PPP: 1.1
PROTHROMBIN TIME: 14.7 SEC (ref 12–14.7)
TROPONIN I SERPL DL<=0.01 NG/ML-MCNC: 0.02 NG/ML (ref ?–0.03)
TROPONIN I SERPL DL<=0.01 NG/ML-MCNC: 0.02 NG/ML (ref ?–0.03)
TROPONIN I SERPL DL<=0.01 NG/ML-MCNC: 0.04 NG/ML (ref ?–0.03)

## 2018-05-03 NOTE — ULT
RIGHT UPPER QUADRANT ULTRASOUND:

 

05/02/2018

 

HISTORY:

Right upper quadrant pain and altered mental status.

 

COMPARISON:

None.

 

TECHNIQUE:

Multiplanar gray-scale sonographic imaging of the right upper quadrant obtained.

 

FINDINGS:

The sonographer reports a negative Goodwin sign.  The pancreas is poorly assessed secondary to bowel g
as.  No focal liver lesion or intrahepatic biliary dilatation is seen.  The common bile duct measures
 4 mm, within normal limits.

 

The gallbladder contains significant sludge, as well as numerous shadowing stones.  No gallbladder wa
ll thickening or pericholecystic fluid.  The right kidney measures 7.6 x 4.1 x 3.5 cm, small, with no
 evidence for hydronephrosis, stone, or mass lesion.

 

IMPRESSION:

The gallbladder contains significant sludge, as well as multiple stones.  No sonographic findings con
cerning for acute cholecystitis or biliary dilatation.

 

POS: [facility]

## 2018-05-03 NOTE — HP
Called patient back and informed her that Dr. Caba would need to see her in order to order a prescription for her for a sinus infection. I offered to schedule her today as Dr. Caba does have some appointments left yet today. Patient declined and stated she would see if the new doctor at [facility] can see her as this is close to her house. I advised she could go to an  near her if that was more convenient or I offered to schedule her for tomorrow with Dr. Caba. She stated she thought that since Dr. Caba knows how she gets sinus infections that she could get a prescription.    Any additional recommendations other then what she has used, see below    Thank you   PRIMARY CARE PHYSICIAN:  None.

 

CHIEF COMPLAINT:  Altered mental status.

 

HISTORY OF PRESENT ILLNESS:  Ms. Martinez is a pleasant 67-year-old lady who was seen at [facility] on 05/03/2018, after she was [facility]

 

She was hospitalized at this facility from 04/28/2018 to 04/29/2018 for fall out of bed with L1 burst
 fracture.  She was discharged to [facility] for further management.

 

Over the last couple of days, patient was reportedly lethargic at inpatient rehabilitation.  Her narc
otics were held to see if she would improve.  When she did not improve, she was sent to the emergency
 room yesterday.

 

Patient herself is unable to provide any history.  She is staring with a vacant look when asked any q
uestions.  Collateral history was obtained from discussion with emergency room physician as well as rosaline bechkam of medical records.

 

REVIEW OF SYSTEMS:  Could not be completed secondary to patient's altered mental status.

 

PAST MEDICAL HISTORY:  Hypertension, degenerative disk disease, L1 compression fracture, right adrena
l mass consistent with benign adrenal adenoma, compression fracture of T7 vertebral body and T12 vert
ebral body.

 

PAST SURGICAL HISTORY:  None.

 

FAMILY HISTORY:  Could not elicit.

 

SOCIAL HISTORY:  Patient smokes 2 packs of cigarettes a day.  No history of alcohol use or recreation
al drug use.

 

ALLERGIES:  BENADRYL and DIPHENHYDRAMINE.

 

CURRENT MEDICATIONS:  Amlodipine 5 mg daily, famotidine 20 mg 2 times a day, polyethylene glycol 17 g
alicia daily, Tylenol p.r.n., Norco p.r.n., calcium carbonate 500 mg every 6 hours as needed, clonidine
 0.1 mg every 6 hours, lactulose 20 mg 3 times a day, guaifenesin p.r.n., loperamide p.r.n., magnesiu
m hydroxide p.r.n., Zofran p.r.n., tizanidine 4 mg 3 times a day, and tramadol p.r.n.

 

PHYSICAL EXAMINATION:

GENERAL:  Ms. Martinez is awake and alert, not in acute distress.

VITAL SIGNS:  Blood pressure is 162/67, pulse is 104.  She is breathing at rate of 22 and saturating 
100% on room air.  She is afebrile.

EYES:  No scleral icterus.  No conjunctival pallor.

ENT:  Moist mucosal membranes, no oropharyngeal erythema or exudates.

NECK:  Supple, nontender, trachea is midline.

RESPIRATORY:  Accessory muscles of breathing are not active.  Chest wall movements are symmetric bila
terally.  Lungs are clear to auscultation without wheeze, rhonchi or crepitations.

CARDIOVASCULAR:  S1 and S2 are heard, tachycardic and regular.  Peripheral pulses palpable.  No carot
id bruit, no pericardial rub.

ABDOMEN:  Soft, nontender, bowel sounds are heard, no hepatomegaly, no splenomegaly.

NEUROLOGIC:  Full neurologic examination not possible secondary to patient's noncooperation.  Pupils 
are 4 mm bilaterally, reactive to light.  No facial droop.  Deep tendon reflexes are 2+, plantar refl
exes downgoing bilaterally.

MUSCULOSKELETAL:  Patient is moving all four extremities.

SKIN:  No rashes or subcutaneous nodules.

LYMPHATIC:  No cervical lymphadenopathy.

PSYCHIATRIC:  Unable to assess mood, affect, or orientation to person, place, or time.

 

LABORATORY DATA:  Ms. Martinez's labs and investigations were reviewed.  I reviewed her electrocardiogram
, which shows normal sinus rhythm, no ST changes to suggest an acute coronary syndrome.  I also revie
wed her chest x-ray, which does not show any pulmonary infiltrates.  She also had noncontrast CT scan
 of the brain, which did not show any acute findings.  She also had CT scan of the abdomen and pelvis
.  I received a call from [facility] that patient has L1 vertebral burst fractures as well as adrenal gland
 enlargement.  She has an unremarkable CBC, INR 1.1, decreased sodium of 133, decreased carbon dioxid
e of 20, normal creatinine, unremarkable liver function tests, indeterminate troponin I of 0.035, nor
mal TSH.  Urinalysis that is positive for blood, bilirubin, and large amount of leukocyte esterase an
d urine toxicology screen that is positive for opiates.

 

ASSESSMENT AND PLAN:  Ms. Martinez is a pleasant 67-year-old lady who was seen at [facility] on 05/03/2018.  Her problem list includes:

1.  Acute encephalopathy:  Etiology is unclear.  Could be secondary to urinary tract infection or to 
medications.  She will be admitted to the hospital for further management.  Differential diagnosis in
cludes other etiologies as well and depending on how she does.  She may need further investigations.

2.  Urinary tract infection:  Patient has been started on ceftriaxone, which I will continue.

3.  Elevated troponin I:  We will recheck troponin level.  If worsening, patient may need to be trans
ferred to telemetry monitor.

4.  Elevated troponin could also be secondary to urinary tract infection.

5.  Hypertension:  Monitor vital signs, titrate antihypertensives as needed.

 

Hold narcotics for now.

 

LEVEL OF RISK:  High.

 

LEVEL OF COMPLEXITY:  High.

 

NOTE:  I could not discuss Ms. Martinez's code status secondary to her altered mental status.  This will 
need to be clarified during the daytime.

## 2018-05-03 NOTE — CT
PRELIMINARY REPORT/[facility]/EMERGENTY AFTER-HOURS PROCEDURE 

 

Addendum created by Kofi Medrano MD on 5/3/2018 3:13 AM Central Time ([place] & [place]) Findings were disc
ussed with Kofi Pettit MD at 5/3/2018 3:12 AM CDT. Initial Report created on 5/3/2018 3:05 AM Centra
l Time ([place] & [place])

 

CT Abdomen and Pelvis With Intravenous Contrast

 

EXAM DATE/TIME:

Exam ordered 5/3/2018 1:43 AM

 

CLINICAL HISTORY:

67 years old, female; Pain; Abdominal pain; Generalized; Patient HX: Sent from rehab (there for an l1
 burst racture) for decreased speech. Alert, though oriented to self. Slowly responds to commands and
 has limited word choice (yes, no, ok). Denies pain currently. Records suggest she has ahd

symptoms for 2 days, but reportedly became more pronounced in the past 2 hours.

 

TECHNIQUE:

Axial computed tomography images of the abdomen and pelvis with intravenous contrast. Coronal reforma
tted images were created and reviewed.

 

COMPARISON:

No relevant prior studies available.

 

FINDINGS:

Lung bases: There is subpleural atelectasis of the dependent portions of the lungs.

 

ABDOMEN:

Liver: There are no focal liver lesions identified.

Gallbladder and bile ducts: The gallbladder is normal. There is no evidence of biliary ductal dilatio
n. No calcified stones.

Pancreas: The pancreas appears normal. No ductal dilation.

Spleen: The spleen demonstrates punctate calcifications, consistent with remote granulomatous organis
m exposure.

Adrenals: There is nonspecific mass like nodularity of the bilateral adrenal glands.

Kidneys and ureters: The kidneys appear normal. No hydronephrosis.

Stomach and bowel: The stomach is normal. The duodenum is unremarkable. The colon is normal. No obstr
uction. No mucosal thickening.

 

PELVIS:

Appendix: A normal appendix is identified.

Bladder: The bladder is normal.

Reproductive: The uterus is normal.

 

ABDOMEN and PELVIS:

Intraperitoneal space: Normal. No free air. No significant fluid collection.

Bones/joints: There is an acute burst fracture of L1 with loss of vertebral body height by approximat
ely 50% and retropulsion of approximately 5 mm. No dislocation.

Soft tissues: Normal.

Vasculature: Normal. No abdominal aortic aneurysm.

Lymph nodes: Normal. No enlarged lymph nodes.

 

IMPRESSION:

1. There is an acute burst fracture of L1 with loss of vertebral body height by approximately 50% and
 retropulsion of approximately 5 mm.

2. There is nonspecific mass like nodularity of the bilateral adrenal glands. A nonemergent noncontra
st CT or MRI adrenal protocol may be performed for further evaluation.

 

Thank you for allowing us to participate in the care of your patient.

Dictated and Authenticated by: Kofi Medrano MD

05/03/2018 3:05 AM Central Time ([place] & [place])

 

 

FINAL REPORT 

 

CT ABDOMEN AND PELVIS WITH IV AND ORAL CONTRAST:

 

DATE: 5/3/18.

TIME: Performed on an emergency basis at 0146 hours.

 

HISTORY: 

Worsening abdominal pain.  Recent trauma.

 

FINDINGS: 

Findings agree with the preliminary report by Dr. Medrano from [facility].  L1 burst fracture i
s again demonstrated.  Oval mass at the left adrenal gland and diffuse prominence of the right adrena
l gland are confirmed.  The left adrenal gland mass is low density and, on recent CT lumbar spine, wa
s confirmed as an adenoma.  Further evaluation is not needed.

 

No acute abnormalities of the abdomen are demonstrated to explain worsening pain.

 

POS: [facility]

## 2018-07-12 ENCOUNTER — HOSPITAL ENCOUNTER (OUTPATIENT)
Dept: HOSPITAL 18 - NAV LAB | Age: 68
Discharge: HOME | End: 2018-07-12
Attending: FAMILY MEDICINE
Payer: MEDICARE

## 2018-07-12 DIAGNOSIS — Z79.899: ICD-10-CM

## 2018-07-12 DIAGNOSIS — I10: Primary | ICD-10-CM

## 2018-07-12 DIAGNOSIS — R35.0: ICD-10-CM

## 2018-07-12 DIAGNOSIS — Z72.0: ICD-10-CM

## 2018-07-12 LAB
ALBUMIN SERPL BCG-MCNC: 3.2 G/DL (ref 3.4–4.8)
ALP SERPL-CCNC: 110 U/L (ref 40–150)
ALT SERPL W P-5'-P-CCNC: 6 U/L (ref 8–55)
ANION GAP SERPL CALC-SCNC: 16 MMOL/L (ref 10–20)
AST SERPL-CCNC: 11 U/L (ref 5–34)
BASOPHILS # BLD AUTO: 0.1 THOU/UL (ref 0–0.2)
BASOPHILS NFR BLD AUTO: 0.8 % (ref 0–1)
BILIRUB DIRECT SERPL-MCNC: 0.2 MG/DL (ref 0.1–0.3)
BILIRUB SERPL-MCNC: 0.3 MG/DL (ref 0.2–1.2)
BUN SERPL-MCNC: 6 MG/DL (ref 9.8–20.1)
CALCIUM SERPL-MCNC: 9.5 MG/DL (ref 7.8–10.44)
CHD RISK SERPL-RTO: 4.7 (ref ?–4.5)
CHLORIDE SERPL-SCNC: 94 MMOL/L (ref 98–107)
CHOLEST SERPL-MCNC: 186 MG/DL
CO2 SERPL-SCNC: 27 MMOL/L (ref 23–31)
CREAT CL PREDICTED SERPL C-G-VRATE: 0 ML/MIN (ref 70–130)
EOSINOPHIL # BLD AUTO: 0.2 THOU/UL (ref 0–0.7)
EOSINOPHIL NFR BLD AUTO: 2.4 % (ref 0–10)
GLUCOSE SERPL-MCNC: 95 MG/DL (ref 80–115)
HDLC SERPL-MCNC: 40 MG/DL
HGB BLD-MCNC: 12.7 G/DL (ref 12–16)
LDLC SERPL CALC-MCNC: 111 MG/DL
LYMPHOCYTES # BLD AUTO: 1.6 THOU/UL (ref 1.2–3.4)
LYMPHOCYTES NFR BLD AUTO: 18.8 % (ref 21–51)
MCH RBC QN AUTO: 26.6 PG (ref 27–31)
MCV RBC AUTO: 85.9 FL (ref 78–98)
MONOCYTES # BLD AUTO: 0.3 THOU/UL (ref 0.11–0.59)
MONOCYTES NFR BLD AUTO: 3.9 % (ref 0–10)
NEUTROPHILS # BLD AUTO: 6.4 THOU/UL (ref 1.4–6.5)
NEUTROPHILS NFR BLD AUTO: 74 % (ref 42–75)
PLATELET # BLD AUTO: 581 THOU/UL (ref 130–400)
POTASSIUM SERPL-SCNC: 2.8 MMOL/L (ref 3.5–5.1)
RBC # BLD AUTO: 4.77 MILL/UL (ref 4.2–5.4)
SODIUM SERPL-SCNC: 134 MMOL/L (ref 136–145)
TRIGL SERPL-MCNC: 173 MG/DL (ref ?–150)
WBC # BLD AUTO: 8.7 THOU/UL (ref 4.8–10.8)

## 2018-07-12 PROCEDURE — 84443 ASSAY THYROID STIM HORMONE: CPT

## 2018-07-12 PROCEDURE — 80061 LIPID PANEL: CPT

## 2018-07-12 PROCEDURE — 85025 COMPLETE CBC W/AUTO DIFF WBC: CPT

## 2018-07-12 PROCEDURE — 36415 COLL VENOUS BLD VENIPUNCTURE: CPT

## 2018-07-12 PROCEDURE — 80076 HEPATIC FUNCTION PANEL: CPT

## 2018-07-12 PROCEDURE — 80048 BASIC METABOLIC PNL TOTAL CA: CPT

## 2018-07-12 PROCEDURE — 83036 HEMOGLOBIN GLYCOSYLATED A1C: CPT

## 2018-07-13 ENCOUNTER — HOSPITAL ENCOUNTER (OUTPATIENT)
Dept: HOSPITAL 18 - NAV LABSP | Age: 68
Discharge: HOME | End: 2018-07-13
Attending: FAMILY MEDICINE
Payer: MEDICARE

## 2018-07-13 DIAGNOSIS — Z79.899: ICD-10-CM

## 2018-07-13 DIAGNOSIS — F17.200: ICD-10-CM

## 2018-07-13 DIAGNOSIS — R35.0: ICD-10-CM

## 2018-07-13 DIAGNOSIS — I10: Primary | ICD-10-CM

## 2018-07-13 LAB
RBC UR QL AUTO: (no result) HPF (ref 0–3)
SP GR UR STRIP: 1.01 (ref 1–1.03)
WBC UR QL AUTO: (no result) HPF (ref 0–3)
YEAST FLD HPF-#/AREA: (no result) HPF

## 2018-07-13 PROCEDURE — 81003 URINALYSIS AUTO W/O SCOPE: CPT

## 2018-07-13 PROCEDURE — 87086 URINE CULTURE/COLONY COUNT: CPT

## 2018-07-13 PROCEDURE — 81015 MICROSCOPIC EXAM OF URINE: CPT

## 2019-10-15 ENCOUNTER — HOSPITAL ENCOUNTER (INPATIENT)
Dept: HOSPITAL 92 - ERS | Age: 69
LOS: 16 days | Discharge: NURSING, REHAB OR LONG-TERM CARE | DRG: 871 | End: 2019-10-31
Attending: INTERNAL MEDICINE | Admitting: INTERNAL MEDICINE
Payer: MEDICARE

## 2019-10-15 VITALS — BODY MASS INDEX: 25.1 KG/M2

## 2019-10-15 DIAGNOSIS — Z51.5: ICD-10-CM

## 2019-10-15 DIAGNOSIS — G92: ICD-10-CM

## 2019-10-15 DIAGNOSIS — I27.20: ICD-10-CM

## 2019-10-15 DIAGNOSIS — E43: ICD-10-CM

## 2019-10-15 DIAGNOSIS — I25.5: ICD-10-CM

## 2019-10-15 DIAGNOSIS — F17.210: ICD-10-CM

## 2019-10-15 DIAGNOSIS — R29.898: ICD-10-CM

## 2019-10-15 DIAGNOSIS — I63.9: ICD-10-CM

## 2019-10-15 DIAGNOSIS — R53.81: ICD-10-CM

## 2019-10-15 DIAGNOSIS — N39.0: ICD-10-CM

## 2019-10-15 DIAGNOSIS — I21.A1: ICD-10-CM

## 2019-10-15 DIAGNOSIS — I50.41: ICD-10-CM

## 2019-10-15 DIAGNOSIS — D69.6: ICD-10-CM

## 2019-10-15 DIAGNOSIS — D64.9: ICD-10-CM

## 2019-10-15 DIAGNOSIS — I13.2: ICD-10-CM

## 2019-10-15 DIAGNOSIS — R65.21: ICD-10-CM

## 2019-10-15 DIAGNOSIS — Z66: ICD-10-CM

## 2019-10-15 DIAGNOSIS — I07.1: ICD-10-CM

## 2019-10-15 DIAGNOSIS — M62.82: ICD-10-CM

## 2019-10-15 DIAGNOSIS — N18.6: ICD-10-CM

## 2019-10-15 DIAGNOSIS — A41.9: Primary | ICD-10-CM

## 2019-10-15 LAB
ALBUMIN SERPL BCG-MCNC: 3.1 G/DL (ref 3.4–4.8)
ALP SERPL-CCNC: 119 U/L (ref 40–110)
ALT SERPL W P-5'-P-CCNC: 8 U/L (ref 8–55)
ANION GAP SERPL CALC-SCNC: 25 MMOL/L (ref 10–20)
ANISOCYTOSIS BLD QL SMEAR: (no result) (100X)
APAP SERPL-MCNC: (no result) MCG/ML (ref 10–30)
APTT PPP: 31.5 SEC (ref 22.9–36.1)
AST SERPL-CCNC: 18 U/L (ref 5–34)
BACTERIA UR QL AUTO: (no result) HPF
BILIRUB SERPL-MCNC: 0.8 MG/DL (ref 0.2–1.2)
BUN SERPL-MCNC: 29 MG/DL (ref 9.8–20.1)
CALCIUM SERPL-MCNC: 8.7 MG/DL (ref 7.8–10.44)
CHLORIDE SERPL-SCNC: 98 MMOL/L (ref 98–107)
CK MB SERPL-MCNC: 9.7 NG/ML (ref 0–6.6)
CO2 SERPL-SCNC: 9 MMOL/L (ref 23–31)
CREAT CL PREDICTED SERPL C-G-VRATE: 0 ML/MIN (ref 70–130)
DRUG SCREEN CUTOFF: (no result)
GLOBULIN SER CALC-MCNC: 3.3 G/DL (ref 2.4–3.5)
GLUCOSE SERPL-MCNC: 198 MG/DL (ref 80–115)
HGB BLD-MCNC: 7.2 G/DL (ref 12–16)
INR PPP: 1.3
LEUKOCYTE ESTERASE UR QL STRIP.AUTO: 500 LEU/UL
MCH RBC QN AUTO: 29.7 PG (ref 27–31)
MCV RBC AUTO: 93.1 FL (ref 78–98)
MDIFF COMPLETE?: YES
MEDTOX CONTROL LINE VALID?: (no result)
MEDTOX READER #: (no result)
PLATELET # BLD AUTO: 576 THOU/UL (ref 130–400)
POIKILOCYTOSIS BLD QL SMEAR: (no result) (100X)
POTASSIUM SERPL-SCNC: 4.6 MMOL/L (ref 3.5–5.1)
PROTHROMBIN TIME: 16.1 SEC (ref 12–14.7)
RBC # BLD AUTO: 2.41 MILL/UL (ref 4.2–5.4)
RBC UR QL AUTO: (no result) HPF (ref 0–3)
SALICYLATES SERPL-MCNC: (no result) MG/DL (ref 15–30)
SODIUM SERPL-SCNC: 127 MMOL/L (ref 136–145)
TROPONIN I SERPL DL<=0.01 NG/ML-MCNC: 1.3 NG/ML (ref ?–0.03)
WBC # BLD AUTO: 24.1 THOU/UL (ref 4.8–10.8)

## 2019-10-15 PROCEDURE — 84484 ASSAY OF TROPONIN QUANT: CPT

## 2019-10-15 PROCEDURE — 80202 ASSAY OF VANCOMYCIN: CPT

## 2019-10-15 PROCEDURE — 70450 CT HEAD/BRAIN W/O DYE: CPT

## 2019-10-15 PROCEDURE — 87077 CULTURE AEROBIC IDENTIFY: CPT

## 2019-10-15 PROCEDURE — 71045 X-RAY EXAM CHEST 1 VIEW: CPT

## 2019-10-15 PROCEDURE — 86850 RBC ANTIBODY SCREEN: CPT

## 2019-10-15 PROCEDURE — 86901 BLOOD TYPING SEROLOGIC RH(D): CPT

## 2019-10-15 PROCEDURE — 86900 BLOOD TYPING SEROLOGIC ABO: CPT

## 2019-10-15 PROCEDURE — 80306 DRUG TEST PRSMV INSTRMNT: CPT

## 2019-10-15 PROCEDURE — P9016 RBC LEUKOCYTES REDUCED: HCPCS

## 2019-10-15 PROCEDURE — 82140 ASSAY OF AMMONIA: CPT

## 2019-10-15 PROCEDURE — 70498 CT ANGIOGRAPHY NECK: CPT

## 2019-10-15 PROCEDURE — 96365 THER/PROPH/DIAG IV INF INIT: CPT

## 2019-10-15 PROCEDURE — 87186 SC STD MICRODIL/AGAR DIL: CPT

## 2019-10-15 PROCEDURE — 87040 BLOOD CULTURE FOR BACTERIA: CPT

## 2019-10-15 PROCEDURE — 96361 HYDRATE IV INFUSION ADD-ON: CPT

## 2019-10-15 PROCEDURE — 36416 COLLJ CAPILLARY BLOOD SPEC: CPT

## 2019-10-15 PROCEDURE — 85610 PROTHROMBIN TIME: CPT

## 2019-10-15 PROCEDURE — 36415 COLL VENOUS BLD VENIPUNCTURE: CPT

## 2019-10-15 PROCEDURE — 83735 ASSAY OF MAGNESIUM: CPT

## 2019-10-15 PROCEDURE — 83880 ASSAY OF NATRIURETIC PEPTIDE: CPT

## 2019-10-15 PROCEDURE — 93880 EXTRACRANIAL BILAT STUDY: CPT

## 2019-10-15 PROCEDURE — 99292 CRITICAL CARE ADDL 30 MIN: CPT

## 2019-10-15 PROCEDURE — 82553 CREATINE MB FRACTION: CPT

## 2019-10-15 PROCEDURE — 82550 ASSAY OF CK (CPK): CPT

## 2019-10-15 PROCEDURE — 87086 URINE CULTURE/COLONY COUNT: CPT

## 2019-10-15 PROCEDURE — 82274 ASSAY TEST FOR BLOOD FECAL: CPT

## 2019-10-15 PROCEDURE — 80307 DRUG TEST PRSMV CHEM ANLYZR: CPT

## 2019-10-15 PROCEDURE — 80069 RENAL FUNCTION PANEL: CPT

## 2019-10-15 PROCEDURE — 94640 AIRWAY INHALATION TREATMENT: CPT

## 2019-10-15 PROCEDURE — 81003 URINALYSIS AUTO W/O SCOPE: CPT

## 2019-10-15 PROCEDURE — 36430 TRANSFUSION BLD/BLD COMPNT: CPT

## 2019-10-15 PROCEDURE — 51702 INSERT TEMP BLADDER CATH: CPT

## 2019-10-15 PROCEDURE — 85730 THROMBOPLASTIN TIME PARTIAL: CPT

## 2019-10-15 PROCEDURE — 81015 MICROSCOPIC EXAM OF URINE: CPT

## 2019-10-15 PROCEDURE — 70496 CT ANGIOGRAPHY HEAD: CPT

## 2019-10-15 PROCEDURE — 85025 COMPLETE CBC W/AUTO DIFF WBC: CPT

## 2019-10-15 PROCEDURE — 70551 MRI BRAIN STEM W/O DYE: CPT

## 2019-10-15 PROCEDURE — 93306 TTE W/DOPPLER COMPLETE: CPT

## 2019-10-15 PROCEDURE — 80048 BASIC METABOLIC PNL TOTAL CA: CPT

## 2019-10-15 PROCEDURE — 96375 TX/PRO/DX INJ NEW DRUG ADDON: CPT

## 2019-10-15 PROCEDURE — 30233N1 TRANSFUSION OF NONAUTOLOGOUS RED BLOOD CELLS INTO PERIPHERAL VEIN, PERCUTANEOUS APPROACH: ICD-10-PCS | Performed by: INTERNAL MEDICINE

## 2019-10-15 PROCEDURE — 93005 ELECTROCARDIOGRAM TRACING: CPT

## 2019-10-15 PROCEDURE — 82805 BLOOD GASES W/O2 SATURATION: CPT

## 2019-10-15 PROCEDURE — 83605 ASSAY OF LACTIC ACID: CPT

## 2019-10-15 PROCEDURE — 80053 COMPREHEN METABOLIC PANEL: CPT

## 2019-10-15 NOTE — CT
CT ANGIOGRAM HEAD

CT ANGIOGRAM NECK

10/15/19

 

COMPARISON: 

None.

 

HISTORY: 

Combative, unresponsive, altered mental status. 

 

TECHNIQUE:  

Axial CT imaging at 1.25 mm intervals from the vertex through the lung apices with IV contrast. Coron
al and sagittal 3D reformatted imaging obtained. 

 

FINDINGS: 

Lung apices are unremarkable.  

 

The retroantral fat and the parapharyngeal fat appears clear bilaterally.  Parotid glands and submand
ibular glands are grossly unremarkable. The region of the tonsillar pillars, epiglottis and pre-epigl
ottic fat, hyoid bone, thyroid cartilage, and cricoid cartilage appears unremarkable. Level of the gl
ottis and thyroid gland are poorly assessed secondary to motion. 

 

The origin of the innominate artery, right subclavian artery, right common carotid artery, left subcl
josy artery and left common carotid artery appear patent with no hemodynamically significant stenosi
s. Bilateral vertebral arteries appear patent. Motion artifact limits detailed assessment of the prox
imal aspect of the common carotid arteries  and the vertebral arteries. The common carotid arteries a
re patent bilaterally.  There is atherosclerotic calcification at the origin of the left internal car
otid artery. No hemodynamically significant stenosis on the basis of NASCET criteria is appreciated w
ithin the distal CCA on either side or involving the internal carotid artery on either side. The left
 vertebral artery is dominant. The right vertebral artery appears to end in PICA. 

 

The basilar artery appears patent. The posterior cerebral arteries appear patent bilaterally. There i
s a patent posterior communicating artery on the right. There is no central arterial occlusion or sac
cular aneurysm appreciated within the posterior circulation. 

 

There is atherosclerotic calcification of the cavernous carotid arteries bilaterally. Motion artifact
 limits detailed assessment of the M1 segment, especially on the left. The MCA bifurcation and the di
stal MCA branches are limited in assessment secondary to motion. Motion also severely limits assessme
nt of the A1 segment and bilateral anterior cerebral arteries. Osseous structures are grossly unremar
kable with respect to acute findings although assessment of the mid and lower cervical spine is marke
dly limited secondary to motion. 

 

IMPRESSION: 

Markedly limited examination secondary to motion, limiting assessment of the intracranial arterial st
ructures as well as the great vessels at the base of the neck. No obvious arterial occlusion is seen.
 If symptoms worsen, repeat imaging is suggested. 

 

Results were discussed with Dr. Valentine at approximately 6:40 p.m., 10/15/19.

 

Code CR 

 

POS: [facility]

## 2019-10-15 NOTE — RAD
FRONTAL RADIOGRAPH CHEST:

10/15/19

 

COMPARISON: 

5/2/18.

 

HISTORY: 

Unresponsive, altered mental status.

 

FINDINGS: 

Mild increased linear interstitial density noted, stable.  No pneumothorax or pleural fluid. No  foca
l consolidation or alveolar edema. 

 

IMPRESSION: 

No acute findings. 

 

POS: [facility]

## 2019-10-15 NOTE — RAD
Frontal view chest



CLINICAL HISTORY: Sepsis



COMPARISON: Earlier same day



FINDINGS: There is a mach line overlying the left chest. Patchy right basilar density is present. Asy
mmetric increased density of the left chest may be due to technique due to patient positioning and

rotation. There is accentuation of the cardiomediastinal silhouette.



IMPRESSION: Technically limited exam by patient positioning.



Continued follow-up is necessary may be obtained.



Transcribed Date/Time: 10/15/2019 9:44 PM



Reported By: Jason Gaxiola 

Electronically Signed:  10/15/2019 9:50 PM

## 2019-10-15 NOTE — CT
CT Brain WO Con: 10/15/2019 12:00 AM



CLINICAL HISTORY: Altered mental status.



COMPARISON: May 2, 2018



FINDINGS: 





Hemorrhage: None.

Ventricular system: Stable prominence, related to ex vacuo dilatation.

Cerebral parenchyma: Moderate chronic microvascular ischemic disease, with multifocal superimposed ca
vitary lacunar infarctions, redemonstrated

Midline shift: None.

Mass: No mass effect.

Calvarium: Normal.

Visualized Paranasal sinuses: Clear.



IMPRESSION:



No acute intracranial hemorrhage or mass effect.

Chronic ischemic disease and a superimposed lacunar infarctions.



Telephone call placed at 1803 hours.





Reported By: Jason Gaxiola 

Electronically Signed:  10/15/2019 6:07 PM

## 2019-10-16 LAB
ALBUMIN SERPL BCG-MCNC: 2.1 G/DL (ref 3.4–4.8)
ALP SERPL-CCNC: 78 U/L (ref 40–110)
ALT SERPL W P-5'-P-CCNC: 22 U/L (ref 8–55)
ANION GAP SERPL CALC-SCNC: 13 MMOL/L (ref 10–20)
AST SERPL-CCNC: 78 U/L (ref 5–34)
BASOPHILS # BLD AUTO: 0 THOU/UL (ref 0–0.2)
BASOPHILS NFR BLD AUTO: 0 % (ref 0–1)
BILIRUB SERPL-MCNC: 0.9 MG/DL (ref 0.2–1.2)
BUN SERPL-MCNC: 22 MG/DL (ref 9.8–20.1)
CALCIUM SERPL-MCNC: 6.4 MG/DL (ref 7.8–10.44)
CHLORIDE SERPL-SCNC: 112 MMOL/L (ref 98–107)
CO2 SERPL-SCNC: 13 MMOL/L (ref 23–31)
CREAT CL PREDICTED SERPL C-G-VRATE: 45 ML/MIN (ref 70–130)
EOSINOPHIL # BLD AUTO: 0 THOU/UL (ref 0–0.7)
EOSINOPHIL NFR BLD AUTO: 0.3 % (ref 0–10)
GLOBULIN SER CALC-MCNC: 2.4 G/DL (ref 2.4–3.5)
GLUCOSE SERPL-MCNC: 102 MG/DL (ref 80–115)
HGB BLD-MCNC: 6.3 G/DL (ref 12–16)
HGB BLD-MCNC: 7 G/DL (ref 12–16)
LYMPHOCYTES # BLD: 0.6 THOU/UL (ref 1.2–3.4)
LYMPHOCYTES NFR BLD AUTO: 5.5 % (ref 21–51)
MCH RBC QN AUTO: 30.9 PG (ref 27–31)
MCV RBC AUTO: 93.1 FL (ref 78–98)
MONOCYTES # BLD AUTO: 0.3 THOU/UL (ref 0.11–0.59)
MONOCYTES NFR BLD AUTO: 2.8 % (ref 0–10)
NEUTROPHILS # BLD AUTO: 9.6 THOU/UL (ref 1.4–6.5)
NEUTROPHILS NFR BLD AUTO: 91.5 % (ref 42–75)
PLATELET # BLD AUTO: 278 THOU/UL (ref 130–400)
POTASSIUM SERPL-SCNC: 4 MMOL/L (ref 3.5–5.1)
RBC # BLD AUTO: 2.03 MILL/UL (ref 4.2–5.4)
SODIUM SERPL-SCNC: 134 MMOL/L (ref 136–145)
WBC # BLD AUTO: 10.5 THOU/UL (ref 4.8–10.8)

## 2019-10-16 NOTE — HP
CHIEF COMPLAINT:  Altered mental status.



HISTORY OF PRESENT ILLNESS:  Ms. Martinez is a 69-year-old female with past medical

history of hypertension, degenerative disk disease, L1 compression fracture, was

brought to the emergency room with altered mental status.  The patient was found to

be very combative.  There was questionable left-sided weakness, nystagmus.  Workup

in the emergency room including CT of the brain and CT angiogram of the brain and

neck, no acute finding.  The patient was found to be septic.  WBC count is elevated

at 24,000.  Anemic with a hemoglobin of 7.2.  The patient was found to have elevated

lactic acid level.  The patient also was found to have urinary tract infection.

Septic workup done in the ED.  They started IV antibiotics.  The patient's initial

blood pressure was low in the systolic in the 70s.  After IV fluid bolus, her

systolic blood pressure in the low 100s.  The patient is being admitted to the CCU

for further management. 



PAST MEDICAL HISTORY:  Is limited, but as per records;

1. Hypertension.

2. Degenerative disk disease, L1 compression fracture.



PAST SURGICAL HISTORY:  No reported past surgical history.



PSYCHIATRIC HISTORY:  No reported previous psychiatric history.



SOCIAL HISTORY:  It is reported the patient currently uses tobacco, cigarette

smoker, smokes about 2 packs per day. 



FAMILY HISTORY:  Unknown.



ALLERGIES:  ALLERGIC TO BENADRYL.



HOME MEDICATIONS:  Unknown.



REVIEW OF SYSTEMS:  Unable to obtain.  The patient is having altered mental status.



PHYSICAL EXAMINATION:

GENERAL:  The patient is having altered mental status, restless.  VITAL SIGNS:

Current vital signs, blood pressure 116/83, pulse is 118, respiratory rate is 20,

temperature 97.7.  The patient is restless, confused. 

HEAD:  Normocephalic, atraumatic. 

NECK:  Supple.  No JVD. 

CHEST:  Fair bilateral air entry. 

HEART:  S1, S2.  Regular, tachycardic. 

ABDOMEN:  Soft.  Bowel sounds present. 

NEUROLOGIC:  The patient is restless, confused, unable to assess. 

PSYCHIATRIC:  Restless and confused. 

EXTREMITIES:  No clubbing, no cyanosis.



LABORATORY DATA:  As mentioned above in the history of present illness.



IMAGING STUDIES:  As mentioned above in the history of present illness.



ASSESSMENT:  

1. Severe sepsis/septic shock.

2. Acute urinary tract infection.

3. Acute metabolic encephalopathy.

4. Anemia.



PLAN:  

1. Admit to CCU.

2. Septic workup including blood cultures, urine cultures.

3. Continue IV antibiotics, IV vancomycin and cefepime.

4. IV fluids.

5. Packed RBCs being ordered by the ED physician.

6. Reconcile home medications.

7. Deep venous thrombosis prophylaxis with SCDs.

8. Expected length of stay, 3 midnights or more.







Job ID:  [id number]

## 2019-10-16 NOTE — CON
DATE OF CONSULTATION:  10/16/2019



REASON FOR CONSULTATION:  Non-STEMI.



HISTORY OF PRESENT ILLNESS:  Ms. Martinez is a pleasant 69-year-old white female, who

comes to the hospital for altered mentation.  She was admitted and found to be in

septic shock and profoundly anemic.  She has a urinary tract infection, she has had

this before.  Troponins were checked, they were elevated and so Cardiology has been

consulted for this.  On my evaluation, she is still nonverbal and unable to give me

any history.  She still has to be on four point restraints that she is combative

still. 



PAST MEDICAL HISTORY:  

1. Hypertension.

2. DJD.



PAST SURGICAL HISTORY:  None.



SOCIAL HISTORY:  Positive for tobacco use about 2 packs a day.  This is per report

note.  No alcohol or drugs. 



FAMILY HISTORY:  Unobtainable.



OUTPATIENT MEDICATIONS:  

1. Tramadol.

2. Tizanidine.

3. Guaifenesin.

4. Clonidine.

5. Ceftriaxone.

6. Senna.

7. MiraLAX.

8. Zofran.

9. Omega-3 fish oil.

10. Multivitamin.

11. Mag-Ox.

12. Loperamide.

13. Lactulose.

14. Norco.

15. Glucagon emergency kit.

16. Pepcid AC.

17. Calcium carbonate.

18. Bisacodyl.

19. Benzocaine menthol sore throat lozenges.

20. Norvasc 5 mg a day.

21. Acetaminophen.



ALLERGIES:  DIPHENHYDRAMINE.



REVIEW OF SYSTEMS:  Unable to obtain as she is nonverbal.



PHYSICAL EXAMINATION:

VITAL SIGNS:  Temperature 96.5, pulse 101, respiratory rate 23, saturating 93% on

room air, blood pressure 135/85. 

GENERAL:  Awake, but nonverbal. 

HEENT:  Normocephalic, atraumatic. 

NECK:  Supple. 

LUNGS:  Clear. 

CARDIOVASCULAR:  S1 and S2.  Tachycardic in the low 100s. 

ABDOMEN:  Soft, positive bowel sounds. 

EXTREMITIES:  No edema. 

SKIN:  Warm and dry.



LABORATORY DATA:  Laboratory work was [place]  [place] [id number] on arrival,

hemoglobin went from 7.2 on arrival to 6.3.  Coags were reviewed.  Chemistries were

reviewed.  Troponin went from 0.56-1.3.  UA is positive and toxicology is negative. 

EKG was reviewed.



ASSESSMENT/PLAN:  

1. Non-ST elevation myocardial infarction, type 2 wart World Health Organization

type of myocardial infarction. 

2. Urosepsis.

3. Metabolic encephalopathy.

4. Tobacco use.



PLAN:  

1. Certainly her troponin elevation is most likely related to her septic shock and

urosepsis.  No indication for anticoagulation in this setting as this is not an

intracoronary thrombotic event.  Given her level of anemia, it would probably be

contraindicated at this time to give any anticoagulation other than DVT prophylaxis. 

2. We will get an echocardiogram. 

Thank you for letting me to participate in the care of your patient.







Job ID:  [id number]

## 2019-10-16 NOTE — CON
DATE OF CONSULTATION:  10/16/2019



70 minutes of time, of that time, greater than 50% spent with the patient and/or the

patient's unit in the hospital. 



REASON FOR CONSULTATION:  Urosepsis.



HISTORY OF PRESENT ILLNESS:  Ms. Martinez is a 69-year-old female, who was admitted to

the hospitalist service last night with a diagnosis of altered mental status and

urosepsis.  She is also found to be severely anemic and required 1 unit of blood

earlier this morning.  Unfortunately, she is very confused and I cannot get much in

the way of history from her.  Her family is currently not here to corroborate her

story. 



PAST MEDICAL HISTORY:  

1. Hypertension.

2. Previous admission for encephalopathy with urosepsis.

3. Degenerative disk disease with L1 compression fracture.



PAST SURGICAL HISTORY:  None reported.



SOCIAL HISTORY:  Apparently smokes 2 packs per day.



FAMILY MEDICAL HISTORY:  Unknown.



PSYCHIATRIC HISTORY:  Unknown.



ALLERGIES:  BENADRYL.



MEDICATIONS:  Prior to admission, there are several medications listed in home

medication section on [facility], however none of these have been confirmed. 



REVIEW OF SYSTEMS:  Cannot be obtained secondary to the patient's confusion.



PHYSICAL EXAMINATION:

VITAL SIGNS:  Temperature 96.8, pulse 111, blood pressure 132/87, and O2 saturation

100% on room air. 

GENERAL:  She is lying in bed.  She is restrained on the rest because of

combativeness.  She can audibilize some one-word sentences, but nothing is coherent. 

HEENT:  Pupils are reactive.  Sclerae anicteric.  Oropharynx is dry.  She has no

teeth. 

NECK:  No adenopathy, JVD, or bruits. 

CHEST:  Clear to auscultation without wheezing or rhonchi. 

CARDIAC:  S1 and S2.  Slightly tachycardic without murmur. 

ABDOMEN:  Soft and nontender.  There is some bruising on the right aspect of her

abdomen. 

EXTREMITIES:  No clubbing or cyanosis.  She has bruising on the back of her thighs.



LABORATORY DATA:  White blood cell count 10.5, hematocrit 18.9, hemoglobin 6.3,

platelet count 278.  INR 1.3, PTT 31.5.  Sodium 134, potassium 4, chloride 112, CO2

of 13, BUN 22, creatinine 1.1, glucose 102.  Lactate 3.2 after being as high as

11.8.  CPK is 561, troponin 1.3, and albumin 2.1.  Urinalysis showed too numerous to

count white blood cells.  Tox screen was negative.  Chest x-ray is somewhat turned,

but shows no evidence of mass, effusion, or infiltrate.  A brain CT was negative. 



ASSESSMENT:  

1. Urosepsis.

2. Metabolic encephalopathy secondary to sepsis.

3. Rhabdomyolysis.

4. Severe protein calorie malnutrition.



PLAN:  The patient is currently being treated with broad-spectrum IV antibiotics to

include cefepime and vancomycin.  She is being hydrated.  Due to her combativeness,

she probably requires close observation in the intensive care unit until her

sensorium improves.  I will go ahead and have her hemoglobin and hematocrit recheck.

 If hemoglobin is less than 7, then she will be transfused. 







Job ID:  [id number]

## 2019-10-16 NOTE — PDOC.HOSPP
- Subjective


Encounter Date: 10/16/19


Encounter Time: 10:40


Subjective: 





Still agitated.  Does not communicate much, but does answer some yes or no 

questions. 





- Objective


Vital Signs & Weight: 


 Vital Signs (12 hours)











  Temp


 


 10/16/19 04:00  96.8 F L


 


 10/15/19 23:27  97.6 F








 Weight











Weight                         128 lb 11.999 oz











 Most Recent Monitor Data











Heart Rate from ECG            105


 


NIBP                           116/68


 


NIBP BP-Mean                   84


 


Respiration from ECG           29


 


SpO2                           90














I&O: 


 











 10/15/19 10/16/19 10/17/19





 06:59 06:59 06:59


 


Intake Total  965 568


 


Output Total  360 100


 


Balance  605 468











Result Diagrams: 


 10/16/19 09:22





 10/16/19 03:53


Additional Labs: 


 Accuchecks











  10/15/19





  17:50


 


POC Glucose  222 H














Hospitalist ROS





- Medication


Medications: 


Active Medications











Generic Name Dose Route Start Last Admin





  Trade Name Freq  PRN Reason Stop Dose Admin


 


Sodium Chloride  1,000 mls @ 125 mls/hr  10/15/19 21:30  10/16/19 08:40





  Normal Saline 0.9%  IV   1,000 mls





  .Q8H GABRIELLE   Administration





     





     





     





     


 


Cefepime HCl 1 gm/ Sodium  100 mls @ 200 mls/hr  10/16/19 09:00  10/16/19 09:23





  Chloride  IVPB   100 mls





  Q12HR GABRIELLE   Administration





     





     





     





     


 


Lorazepam  0.5 mg  10/15/19 22:52  10/16/19 01:28





  Ativan  SLOW IVP   0.5 mg





  Q3H PRN   Administration





  Agitation   





     





     





     














- Exam


General - other findings: Agitated.


ENT - other findings: Crusted inner lips.  Left upper lip appears slightly 

swollen, no abrasion.


Heart: RRR, no murmur, no gallops, no rubs, normal peripheral pulses


Respiratory: CTAB, no wheezes, no rales, no ronchi, normal chest expansion, no 

tachypnea, normal percussion


Gastrointestinal: soft, non-distended, normal bowel sounds, no palpable masses, 

no hepatomegaly


Gastrointestinal - other findings: Mild diffusely tender, but seems to localize 

to LLQ


Extremities - other findings: Ecchymoses on LE's (see wound photos)


Musculoskeletal: normal tone, normal strength


Psychiatric - other findings: Agitated





Hosp A/P


(1) Sepsis


Code(s): A41.9 - SEPSIS, UNSPECIFIED ORGANISM   Status: Acute   





(2) UTI (urinary tract infection)


Status: Acute   





(3) Anemia


Code(s): D64.9 - ANEMIA, UNSPECIFIED   Status: Acute   





(4) Myocardial infarction


Code(s): I21.9 - ACUTE MYOCARDIAL INFARCTION, UNSPECIFIED   Status: Acute   


Qualifiers: 


   Myocardial infarction type: type 2   Qualified Code(s): I21.A1 - Myocardial 

infarction type 2   





(5) Acidosis


Code(s): E87.2 - ACIDOSIS   Status: Acute   





(6) Toxic metabolic encephalopathy


Code(s): G92 - TOXIC ENCEPHALOPATHY   Status: Acute   





- Plan





Encephalopathy related to sepsis.


Sepsis appears to be related to UTI.


Cultures in progress.





Has some abdominal tenderness. 


Needs imaging when agitation improved.





Severe anemia.  


Last known level was normal.


Guaiac stools.


Transfuse 2nd unit PRBC.





Acidosis presumed due to sepsis.


Lactic acid is improving.





Ecchymosis on LE's.  


Etiology is unclear.  


Coags are relatively normal.


May be traumatic related to agitation.


Restraints applied to prevent self harm. 





Trops elevated likely from NSTEMI Type II due to sepsis.


Cardiology consult pending.





She appears to be moving her left side, but not as much as the right.


Imaging compromised by her movement.  


May need repeat when stabilized.

## 2019-10-17 LAB
ANALYZER IN CARDIO: (no result)
ANION GAP SERPL CALC-SCNC: 13 MMOL/L (ref 10–20)
BASE EXCESS STD BLDA CALC-SCNC: -11.7 MEQ/L
BASOPHILS # BLD AUTO: 0 THOU/UL (ref 0–0.2)
BASOPHILS NFR BLD AUTO: 0.1 % (ref 0–1)
BUN SERPL-MCNC: 20 MG/DL (ref 9.8–20.1)
CA-I BLDA-SCNC: 1.1 MMOL/L (ref 1.12–1.3)
CALCIUM SERPL-MCNC: 7.5 MG/DL (ref 7.8–10.44)
CHLORIDE SERPL-SCNC: 116 MMOL/L (ref 98–107)
CO2 SERPL-SCNC: 12 MMOL/L (ref 23–31)
CREAT CL PREDICTED SERPL C-G-VRATE: 49 ML/MIN (ref 70–130)
EOSINOPHIL # BLD AUTO: 0.2 THOU/UL (ref 0–0.7)
EOSINOPHIL NFR BLD AUTO: 1.7 % (ref 0–10)
GLUCOSE SERPL-MCNC: 104 MG/DL (ref 80–115)
HCO3 BLDA-SCNC: 12 MEQ/L (ref 22–28)
HCT VFR BLDA CALC: 24 % (ref 36–47)
HGB BLD-MCNC: 8.3 G/DL (ref 12–16)
HGB BLDA-MCNC: 8.2 G/DL (ref 12–16)
LYMPHOCYTES # BLD: 1 THOU/UL (ref 1.2–3.4)
LYMPHOCYTES NFR BLD AUTO: 10 % (ref 21–51)
MCH RBC QN AUTO: 29.4 PG (ref 27–31)
MCV RBC AUTO: 90 FL (ref 78–98)
MONOCYTES # BLD AUTO: 0.6 THOU/UL (ref 0.11–0.59)
MONOCYTES NFR BLD AUTO: 5.5 % (ref 0–10)
NEUTROPHILS # BLD AUTO: 8.4 THOU/UL (ref 1.4–6.5)
NEUTROPHILS NFR BLD AUTO: 82.7 % (ref 42–75)
PCO2 BLDA: 20.8 MMHG (ref 35–45)
PH BLDA: 7.38 [PH] (ref 7.35–7.45)
PLATELET # BLD AUTO: 262 THOU/UL (ref 130–400)
PO2 BLDA: 75.6 MMHG (ref 80–?)
POTASSIUM BLD-SCNC: 4.1 MMOL/L (ref 3.7–5.3)
POTASSIUM SERPL-SCNC: 4.3 MMOL/L (ref 3.5–5.1)
RBC # BLD AUTO: 2.8 MILL/UL (ref 4.2–5.4)
SODIUM SERPL-SCNC: 137 MMOL/L (ref 136–145)
SPECIMEN DRAWN FROM PATIENT: (no result)
VANCOMYCIN TROUGH SERPL-MCNC: 8 UG/ML
WBC # BLD AUTO: 10.2 THOU/UL (ref 4.8–10.8)

## 2019-10-17 RX ADMIN — Medication SCH ML: at 20:55

## 2019-10-17 NOTE — PRG
DATE OF SERVICE:  10/17/2019



TIME SPENT:  35 minutes of critical care time.



SUBJECTIVE:  The patient remains grossly encephalopathic, calling for help all the

time.  She had to be put on a Precedex drip last night. 



OBJECTIVE:  VITAL SIGNS:  Temperature is 97.6, pulse 84, and blood pressure 116/63.

Total intake for 24 hours, 3761.  Output 573. 

HEENT:  Unremarkable. 

NECK:  No JVD. 

LUNGS:  Clear. 

CARDIOVASCULAR:  S1, S2.  Regular. 

ABDOMEN:  Soft. 

EXTREMITIES:  Bruised.



LABORATORY DATA:  Sodium 137, potassium 4.3, chloride 116, CO2 of 12, BUN 20,

creatinine 1.0, and glucose 104.  Her last lactate was 3.2.  Calcium is 7.5.  White

blood cell count 10.2, hematocrit 25.2, and platelet count 262. 



ASSESSMENT:  

1. Gross encephalopathy.

2. Urosepsis.

3. Severe metabolic acidosis, which appears to be non-anion gap.



PLAN:  

1. I will go ahead and switch her over to bicarbonate in her IV fluids.

2. Check a blood gas.

3. Continue antibiotics.

4. Continue Precedex and nicotine patch.







Job ID:  [id number]

## 2019-10-17 NOTE — PDOC.CPN
- Subjective


Date: 10/17/19


Time: 18:05


Interval history: 





Remains confused. 





- Review of Systems


ROS unobtainable: due to mental status





- Objective


Allergies/Adverse Reactions: 


 Allergies











Allergy/AdvReac Type Severity Reaction Status Date / Time


 


diphenhydramine Allergy   Verified 04/28/18 23:35





[From Benadryl]     











Visit Medications: 


 Current Medications





Albuterol/Ipratropium (Duoneb)  3 ml NEB Q6H PRN


   PRN Reason: .WHEEZING


   Last Admin: 10/16/19 18:30 Dose:  3 ml


Cefepime HCl 1 gm/ Sodium (Chloride)  100 mls @ 200 mls/hr IVPB Q12HR GABRIELLE


   Last Admin: 10/17/19 08:47 Dose:  100 mls


Vancomycin HCl 500 mg/ Sodium (Chloride)  100 mls @ 100 mls/hr IVPB 2000 GABRIELLE


   Last Admin: 10/16/19 20:47 Dose:  100 mls


Dexmedetomidine HCl 400 mcg/ (Sodium Chloride)  100 mls @ 0 mls/hr IVPB INF GABRIELLE

; Protocol


   Last Admin: 10/17/19 08:48 Dose:  100 mls


Sodium Bicarbonate 70 meq/ (Sodium Chloride)  1,070 mls @ 100 mls/hr IV 

.[id number] GABRIELLE


   Last Admin: 10/17/19 17:13 Dose:  1,070 mls


Lorazepam (Ativan)  0.5 mg SLOW IVP Q3H PRN


   PRN Reason: Agitation


   Last Admin: 10/17/19 16:11 Dose:  0.5 mg


Miscellaneous Medication (Pharmacy To Dose)  1 each IVPB PRN PRN


   PRN Reason: Pharmacy to dose


Nicotine (Nicoderm Patch)  21 mg TD DAILY [facility]


   Last Admin: 10/17/19 08:43 Dose:  21 mg








Vital Signs & Weight: 


 Vital Signs











  Temp Pulse Ox


 


 10/17/19 16:00  97.9 F 


 


 10/17/19 12:00  97.5 F L 


 


 10/17/19 08:00  97.5 F L 


 


 10/17/19 07:39   96








 











Admit Weight                   128 lb


 


Weight                         128 lb 11.999 oz

















- Physical Exam


General: other (Confused)


HEENT: normocephaly


Neck: supple neck


Cardiac: regular rate and rhythm


Lungs: clear to auscultation


Neuro: no lateralizing findings


Abdomen: active bowel sounds


Extremities: no edema


Skin: brusing





- Labs


Result Diagrams: 


 10/17/19 04:55





 10/17/19 04:55


 Troponin/CKMB











CK-MB (CK-2)  9.7 ng/mL (0-6.6)  H*  10/15/19  17:51    


 


Troponin I  1.302 ng/mL (< 0.028)  H*  10/15/19  21:30    














- Telemetry


Sinus rhythms and dysrhythmias: sinus rhythm





- Assessment/Plan


Assessment/Plan: 





1. AMS


2. Type 2 demand ischemia. 


3. Septic shock


4. UTI sepsis. 


5. Anemia





PLAN:


- No indication for full anticoagulation.


- Likely demand ischemia from sepsis. 


- Continue conservative care.


- Echo pending.

## 2019-10-17 NOTE — PDOC.HOSPP
- Subjective


Subjective: 





Not interactive. 





- Objective


Vital Signs & Weight: 


 Vital Signs (12 hours)











  Temp Pulse Ox


 


 10/17/19 12:00  97.5 F L 


 


 10/17/19 08:00  97.5 F L 


 


 10/17/19 07:39   96


 


 10/17/19 03:15   96


 


 10/17/19 03:00  97.6 F 








 Weight











Admit Weight                   128 lb


 


Weight                         128 lb 11.999 oz











 Most Recent Monitor Data











Heart Rate from ECG            80


 


NIBP                           92/78


 


NIBP BP-Mean                   82


 


Respiration from ECG           20


 


SpO2                           96














I&O: 


 











 10/16/19 10/17/19 10/18/19





 06:59 06:59 06:59


 


Intake Total 965 4561 


 


Output Total 360 573 155


 


Balance 605 3188 -155











Result Diagrams: 


 10/17/19 04:55





 10/17/19 04:55





Hospitalist ROS





- Medication


Medications: 


Active Medications











Generic Name Dose Route Start Last Admin





  Trade Name Freq  PRN Reason Stop Dose Admin


 


Albuterol/Ipratropium  3 ml  10/16/19 17:49  10/16/19 18:30





  Duoneb  NEB   3 ml





  Q6H PRN   Administration





  .WHEEZING   





     





     





     


 


Cefepime HCl 1 gm/ Sodium  100 mls @ 200 mls/hr  10/16/19 09:00  10/17/19 08:47





  Chloride  IVPB   100 mls





  Q12HR GABRIELLE   Administration





     





     





     





     


 


Vancomycin HCl 500 mg/ Sodium  100 mls @ 100 mls/hr  10/16/19 20:00  10/16/19 20

:47





  Chloride  IVPB   100 mls





  2000 GABRIELLE   Administration





     





     





     





     


 


Dexmedetomidine HCl 400 mcg/  100 mls @ 0 mls/hr  10/16/19 22:45  10/17/19 08:48





  Sodium Chloride  IVPB   100 mls





  INF GABRIELLE   Administration





     





     





  Protocol   





  Titrate   


 


Sodium Bicarbonate 70 meq/  1,070 mls @ 100 mls/hr  10/17/19 07:15  10/17/19 08:

42





  Sodium Chloride  IV   1,070 mls





  .[id number] GABRIELLE   Administration





     





     





     





     


 


Lorazepam  0.5 mg  10/15/19 22:52  10/17/19 12:47





  Ativan  SLOW IVP   0.5 mg





  Q3H PRN   Administration





  Agitation   





     





     





     


 


Nicotine  21 mg  10/17/19 09:00  10/17/19 08:43





  Nicoderm Patch  TD   21 mg





  DAILY GABRIELLE   Administration





     





     





     





     














- Exam


General - other findings: Encephalopathic.  Will open eyes and make eye contact.


Heart: RRR, no murmur, no gallops, no rubs, normal peripheral pulses


Respiratory: CTAB, no wheezes, no rales, no ronchi, normal chest expansion, no 

tachypnea, normal percussion


Respiratory - other findings: Diminished


Gastrointestinal: soft, non-tender, non-distended, normal bowel sounds, no 

palpable masses, no hepatomegaly, no splenomegaly, no bruit


Extremities: no cyanosis, no clubbing, no edema


Skin: normal turgor


Musculoskeletal: normal tone


Psychiatric - other findings: Encephalopathic.  





Hosp A/P


(1) Sepsis


Code(s): A41.9 - SEPSIS, UNSPECIFIED ORGANISM   Status: Acute   





(2) UTI (urinary tract infection)


Status: Acute   


Plan: 


Klebsiella.








(3) Anemia


Code(s): D64.9 - ANEMIA, UNSPECIFIED   Status: Acute   





(4) Myocardial infarction


Code(s): I21.9 - ACUTE MYOCARDIAL INFARCTION, UNSPECIFIED   Status: Acute   


Qualifiers: 


   Myocardial infarction type: type 2   Qualified Code(s): I21.A1 - Myocardial 

infarction type 2   





(5) Acidosis


Code(s): E87.2 - ACIDOSIS   Status: Acute   





(6) Toxic metabolic encephalopathy


Code(s): G92 - TOXIC ENCEPHALOPATHY   Status: Acute   





- Plan





Encephalopathy related to sepsis.


Sepsis appears to be related to UTI.


Cultures in growing pan-sensitive Klebsiella.  





Has some abdominal tenderness. 


May imaging when agitation improved it it persists.





Severe anemia.  


Last known level was normal.


Guaiac stools negative.


Transfused 2nd unit PRBC.





Acidosis presumed due to sepsis.


Lactic acid is improving.





Ecchymosis on LE's.  


Etiology is unclear.  


Coags are relatively normal.


May be traumatic related to agitation.


Restraints applied to prevent self harm. 





Trops elevated likely from NSTEMI Type II due to sepsis.





Discussed with Dr. Sesay.  Bicarb for acidosis.

## 2019-10-18 LAB
ANION GAP SERPL CALC-SCNC: 12 MMOL/L (ref 10–20)
BASOPHILS # BLD AUTO: 0 THOU/UL (ref 0–0.2)
BASOPHILS NFR BLD AUTO: 0.5 % (ref 0–1)
BUN SERPL-MCNC: 16 MG/DL (ref 9.8–20.1)
CALCIUM SERPL-MCNC: 7.4 MG/DL (ref 7.8–10.44)
CHLORIDE SERPL-SCNC: 109 MMOL/L (ref 98–107)
CO2 SERPL-SCNC: 18 MMOL/L (ref 23–31)
CREAT CL PREDICTED SERPL C-G-VRATE: 64 ML/MIN (ref 70–130)
EOSINOPHIL # BLD AUTO: 0.2 THOU/UL (ref 0–0.7)
EOSINOPHIL NFR BLD AUTO: 2.1 % (ref 0–10)
GLUCOSE SERPL-MCNC: 98 MG/DL (ref 80–115)
HGB BLD-MCNC: 9.7 G/DL (ref 12–16)
LYMPHOCYTES # BLD: 1.2 THOU/UL (ref 1.2–3.4)
LYMPHOCYTES NFR BLD AUTO: 14.5 % (ref 21–51)
MCH RBC QN AUTO: 30.2 PG (ref 27–31)
MCV RBC AUTO: 90.3 FL (ref 78–98)
MONOCYTES # BLD AUTO: 0.3 THOU/UL (ref 0.11–0.59)
MONOCYTES NFR BLD AUTO: 4.2 % (ref 0–10)
NEUTROPHILS # BLD AUTO: 6.4 THOU/UL (ref 1.4–6.5)
NEUTROPHILS NFR BLD AUTO: 78.7 % (ref 42–75)
PLATELET # BLD AUTO: 255 THOU/UL (ref 130–400)
POTASSIUM SERPL-SCNC: 3.6 MMOL/L (ref 3.5–5.1)
RBC # BLD AUTO: 3.22 MILL/UL (ref 4.2–5.4)
SODIUM SERPL-SCNC: 135 MMOL/L (ref 136–145)
WBC # BLD AUTO: 8.1 THOU/UL (ref 4.8–10.8)

## 2019-10-18 RX ADMIN — Medication SCH: at 09:54

## 2019-10-18 RX ADMIN — Medication SCH: at 20:52

## 2019-10-18 NOTE — PRG
DATE OF SERVICE:  10/18/2019



SUBJECTIVE:  This patient remains in the ICU.  She is grossly encephalopathic and in

4-point restraints.  I cannot detect any neurologic change compared to yesterday.

She is still requiring a Precedex drip for sedation. 



OBJECTIVE:  VITAL SIGNS:  On exam, temperature is 98.0, pulse 72, blood pressure

182/82, and O2 saturation 100% on room air.  Total intake 3128, output 1555. 

HEENT:  Edentulous. 

NECK:  No adenopathy or JVD. 

LUNGS:  Clear. 

CARDIAC:  S1 and S2.  Slightly tachycardic. 

ABDOMEN:  Soft. 

EXTREMITIES:  No edema.



LABORATORY DATA:  Sodium 135, potassium 3.6, chloride 109, CO2 of 18, BUN 16,

creatinine 0.7, and glucose 98.  White blood cell count 8.1, hemoglobin 9.7,

hematocrit 29.1, and platelet count 255.  Urine cultures growing out Klebsiella

pneumoniae. 



ASSESSMENT:  

1. Klebsiella pneumoniae urinary tract infection.

2. Gross encephalopathy.

3. Resolving metabolic acidosis, non-anion gap.

4. Possible withdrawal from muscle relaxants.

5. Nicotine dependence.



PLAN:  I will go ahead and try low-dose antipsychotic to see if that will help

continue the cefepime, but discontinue the vancomycin since nothing in the MRSA

realm has grown out. 







Job ID:  [id number]

## 2019-10-18 NOTE — PDOC.HOSPP
- Subjective


non-verbal





- Objective


Vital Signs & Weight: 


 Vital Signs (12 hours)











  Temp Pulse Ox


 


 10/18/19 15:50  96.9 F L 


 


 10/18/19 07:33   95


 


 10/18/19 04:00  98.0 F 








 Weight











Admit Weight                   128 lb


 


Weight                         128 lb 11.999 oz











 Most Recent Monitor Data











Heart Rate from ECG            68


 


NIBP                           140/79


 


NIBP BP-Mean                   99


 


Respiration from ECG           32


 


SpO2                           99














I&O: 


 











 10/17/19 10/18/19 10/19/19





 06:59 06:59 06:59


 


Intake Total 3761 3128 200


 


Output Total 573 1555 660


 


Balance 1220 3444 -032











Result Diagrams: 


 10/18/19 04:00





 10/18/19 04:00





Hospitalist ROS





- Medication


Medications: 


Active Medications











Generic Name Dose Route Start Last Admin





  Trade Name Freq  PRN Reason Stop Dose Admin


 


Albuterol/Ipratropium  3 ml  10/16/19 17:49  10/16/19 18:30





  Duoneb  NEB   3 ml





  Q6H PRN   Administration





  .WHEEZING   





     





     





     


 


Cefepime HCl 1 gm/ Sodium  100 mls @ 200 mls/hr  10/16/19 09:00  10/18/19 07:55





  Chloride  IVPB   100 mls





  Q12HR GABRIELLE   Administration





     





     





     





     


 


Dexmedetomidine HCl 400 mcg/  100 mls @ 0 mls/hr  10/16/19 22:45  10/17/19 20:09





  Sodium Chloride  IVPB   100 mls





  INF GABRIELLE   Administration





     





     





  Protocol   





  Titrate   


 


Sodium Bicarbonate 70 meq/  1,070 mls @ 100 mls/hr  10/17/19 07:15  10/18/19 03:

55





  Sodium Chloride  IV   1,070 mls





  .[id number] GABRIELLE   Administration





     





     





     





     


 


Lorazepam  0.5 mg  10/15/19 22:52  10/18/19 11:14





  Ativan  SLOW IVP   0.5 mg





  Q3H PRN   Administration





  Agitation   





     





     





     


 


Nicotine  21 mg  10/17/19 09:00  10/18/19 07:56





  Nicoderm Patch  TD   21 mg





  DAILY GABRIELLE   Administration





     





     





     





     


 


Sodium Chloride  10 ml  10/17/19 21:00  10/18/19 09:54





  Flush - Normal Saline  IVF   Not Given





  Q12HR GABRIELLE   





     





     





     





     


 


Ziprasidone  20 mg  10/18/19 07:30  10/18/19 08:02





  Geodon  IM   20 mg





  Q4H PRN   Administration





  Agitation   





     





     





     














- Exam


General - other findings: Encephalopathic.  Still borderline agitated. 


Heart: RRR, no murmur, no gallops, no rubs, normal peripheral pulses


Respiratory: CTAB, no wheezes, no rales, no ronchi, normal chest expansion, 

normal percussion, tachypneic


Gastrointestinal: soft, non-tender, non-distended, normal bowel sounds, no 

palpable masses, no hepatomegaly, no splenomegaly, no bruit


Skin: normal turgor, no lesions, no rashes


Musculoskeletal: normal tone, normal strength, no muscle wasting


Psychiatric: normal affect, normal behavior, A&O x 3





Hosp A/P


(1) Sepsis


Code(s): A41.9 - SEPSIS, UNSPECIFIED ORGANISM   Status: Acute   





(2) UTI (urinary tract infection)


Status: Acute   





(3) Anemia


Code(s): D64.9 - ANEMIA, UNSPECIFIED   Status: Acute   





(4) Myocardial infarction


Code(s): I21.9 - ACUTE MYOCARDIAL INFARCTION, UNSPECIFIED   Status: Acute   


Qualifiers: 


   Myocardial infarction type: type 2   Qualified Code(s): I21.A1 - Myocardial 

infarction type 2   





(5) Acidosis


Code(s): E87.2 - ACIDOSIS   Status: Acute   





(6) Toxic metabolic encephalopathy


Code(s): G92 - TOXIC ENCEPHALOPATHY   Status: Acute   





- Plan





Encephalopathy may be due to sepsis but that should be much better by now.


Could be having some WD from muscle relaxers that she had listed on her med 

list. 





Sepsis appears to be related to UTI.


Cultures in growing pan-sensitive Klebsiella.  


Continue IV abx. 





Has some abdominal tenderness. 


Seems to be better now. 





Severe anemia.  


Last known level was normal.


Guaiac stools negative.


Transfused 2nd unit PRBC.





Acidosis presumed due to sepsis.


Lactic acid is improving.





Ecchymosis on LE's.  


Etiology is unclear.  


Coags are relatively normal.


May be traumatic related to agitation.


Restraints applied to prevent self harm. 





Trops elevated likely from NSTEMI Type II due to sepsis.


Cardiology following.

## 2019-10-19 LAB
ANION GAP SERPL CALC-SCNC: 20 MMOL/L (ref 10–20)
BUN SERPL-MCNC: 20 MG/DL (ref 9.8–20.1)
CALCIUM SERPL-MCNC: 7.2 MG/DL (ref 7.8–10.44)
CHLORIDE SERPL-SCNC: 106 MMOL/L (ref 98–107)
CO2 SERPL-SCNC: 14 MMOL/L (ref 23–31)
CREAT CL PREDICTED SERPL C-G-VRATE: 58 ML/MIN (ref 70–130)
GLUCOSE SERPL-MCNC: 85 MG/DL (ref 80–115)
HGB BLD-MCNC: 11.7 G/DL (ref 12–16)
MAGNESIUM SERPL-MCNC: 1.6 MG/DL (ref 1.6–2.6)
MCH RBC QN AUTO: 29.9 PG (ref 27–31)
MCV RBC AUTO: 89.6 FL (ref 78–98)
MDIFF COMPLETE?: YES
PLATELET # BLD AUTO: 262 THOU/UL (ref 130–400)
POTASSIUM SERPL-SCNC: 3.8 MMOL/L (ref 3.5–5.1)
RBC # BLD AUTO: 3.9 MILL/UL (ref 4.2–5.4)
SODIUM SERPL-SCNC: 136 MMOL/L (ref 136–145)
WBC # BLD AUTO: 10.2 THOU/UL (ref 4.8–10.8)

## 2019-10-19 RX ADMIN — Medication SCH ML: at 08:31

## 2019-10-19 RX ADMIN — Medication SCH ML: at 19:55

## 2019-10-19 NOTE — RAD
EXAM: 2 views of the left shoulder



HISTORY: Shoulder pain with lack of movement



COMPARISON: None



FINDINGS: There is no evidence of acute fracture or dislocation. No degenerative changes are seen. Th
e visualized thorax is unremarkable.



IMPRESSION:

No evidence of acute osseous abnormality.



Reported By: Meir Yarbrough 

Electronically Signed:  10/19/2019 5:44 PM

## 2019-10-19 NOTE — PDOC.HOSPP
- Subjective


Subjective: 





Still encephalopathic and on Precedex.  Non-verbal.





- Objective


Vital Signs & Weight: 


 Vital Signs (12 hours)











  Temp Pulse Ox


 


 10/19/19 08:00   99


 


 10/19/19 07:00  98.1 F 


 


 10/19/19 04:00  98.7 F 


 


 10/19/19 00:00  98.0 F 








 Weight











Admit Weight                   128 lb


 


Weight                         128 lb 11.999 oz











 Most Recent Monitor Data











Heart Rate from ECG            89


 


NIBP                           120/59


 


NIBP BP-Mean                   79


 


Respiration from ECG           39


 


SpO2                           99














I&O: 


 











 10/18/19 10/19/19 10/20/19





 06:59 06:59 06:59


 


Intake Total 3128 1503 0


 


Output Total 1555 834 51


 


Balance 1573 669 -51











Result Diagrams: 


 10/19/19 06:11





 10/19/19 06:11





Hospitalist ROS





- Medication


Medications: 


Active Medications











Generic Name Dose Route Start Last Admin





  Trade Name Freq  PRN Reason Stop Dose Admin


 


Albuterol/Ipratropium  3 ml  10/16/19 17:49  10/16/19 18:30





  Duoneb  NEB   3 ml





  Q6H PRN   Administration





  .WHEEZING   





     





     





     


 


Cefepime HCl 1 gm/ Sodium  100 mls @ 200 mls/hr  10/16/19 09:00  10/19/19 08:30





  Chloride  IVPB   100 mls





  Q12HR GABRIELLE   Administration





     





     





     





     


 


Dexmedetomidine HCl 400 mcg/  100 mls @ 0 mls/hr  10/16/19 22:45  10/17/19 20:09





  Sodium Chloride  IVPB   100 mls





  INF GARBIELLE   Administration





     





     





  Protocol   





  Titrate   


 


Sodium Bicarbonate 70 meq/  1,070 mls @ 100 mls/hr  10/17/19 07:15  10/19/19 03:

22





  Sodium Chloride  IV   1,070 mls





  .[id number] GABRIELLE   Administration





     





     





     





     


 


Lorazepam  0.5 mg  10/15/19 22:52  10/18/19 11:14





  Ativan  SLOW IVP   0.5 mg





  Q3H PRN   Administration





  Agitation   





     





     





     


 


Nicotine  21 mg  10/17/19 09:00  10/19/19 08:31





  Nicoderm Patch  TD   21 mg





  DAILY GABRIELLE   Administration





     





     





     





     


 


Sodium Chloride  10 ml  10/17/19 21:00  10/19/19 08:31





  Flush - Normal Saline  IVF   10 ml





  Q12HR GABRIELLE   Administration





     





     





     





     


 


Ziprasidone  20 mg  10/18/19 07:30  10/19/19 00:58





  Geodon  IM   20 mg





  Q4H PRN   Administration





  Agitation   





     





     





     














- Exam


General Appearance: NAD


Heart: RRR, no murmur, no gallops, no rubs, normal peripheral pulses


Respiratory: CTAB, no wheezes, no rales, no ronchi, normal chest expansion, no 

tachypnea, normal percussion, tachypneic


Respiratory - other findings: Has wet cough.  


Gastrointestinal: soft, non-distended, normal bowel sounds, no palpable masses


Extremities: no cyanosis, no clubbing, no edema


Extremities - other findings: Large areas of ecchymoses persist. 


Musculoskeletal: normal tone





Hosp A/P


(1) Sepsis


Code(s): A41.9 - SEPSIS, UNSPECIFIED ORGANISM   Status: Acute   





(2) UTI (urinary tract infection)


Status: Acute   





(3) Anemia


Code(s): D64.9 - ANEMIA, UNSPECIFIED   Status: Acute   





(4) Myocardial infarction


Code(s): I21.9 - ACUTE MYOCARDIAL INFARCTION, UNSPECIFIED   Status: Acute   


Qualifiers: 


   Myocardial infarction type: type 2   Qualified Code(s): I21.A1 - Myocardial 

infarction type 2   





(5) Acidosis


Code(s): E87.2 - ACIDOSIS   Status: Acute   





(6) Toxic metabolic encephalopathy


Code(s): G92 - TOXIC ENCEPHALOPATHY   Status: Acute   





(7) Ecchymosis


Code(s): R58 - HEMORRHAGE, NOT ELSEWHERE CLASSIFIED   Status: Acute   


Plan: 


LE's








- Plan





Encephalopathy may be due to sepsis but that should be much better by now.


Could be having some WD from muscle relaxers that she had listed on her med 

list. 


On Precedex and that is keeping her stable. 





Sepsis appears to be related to UTI.


Cultures in growing pan-sensitive Klebsiella.  


Continue IV abx. 





Has some abdominal tenderness. 


Seems to be better now. 





Severe anemia.  


Last known level was normal.


Guaiac stools negative.


Transfused 2nd unit PRBC.


Subsequently stable. 





Acidosis presumed due to sepsis.


Lactic acid is improving.





Ecchymosis on LE's.  


Etiology is unclear.  


Present on admission. 


Coags are relatively normal.


May be traumatic related to agitation.


Restraints applied to prevent self harm. 





Trops elevated likely from NSTEMI Type II due to sepsis.


Cardiology following.

## 2019-10-19 NOTE — EKG
Test Reason : 

Blood Pressure : ***/*** mmHG

Vent. Rate : 123 BPM     Atrial Rate : 123 BPM

   P-R Int : 170 ms          QRS Dur : 064 ms

    QT Int : 316 ms       P-R-T Axes : 057 040 048 degrees

   QTc Int : 452 ms

 

Sinus tachycardia with Premature atrial complexes with Abberant conduction

Low voltage QRS

Cannot rule out Anterior infarct , age undetermined

Abnormal ECG

 

Confirmed by FER SAAVEDRA ([id number]),  LEONILA SMALL ([id number]) on 10/19/2019 3:47:43 PM

 

Referred By:             Confirmed By:FER SAAVEDRA

## 2019-10-20 LAB
ANION GAP SERPL CALC-SCNC: 16 MMOL/L (ref 10–20)
BASOPHILS # BLD AUTO: 0 THOU/UL (ref 0–0.2)
BASOPHILS NFR BLD AUTO: 0.1 % (ref 0–1)
BUN SERPL-MCNC: 22 MG/DL (ref 9.8–20.1)
CALCIUM SERPL-MCNC: 7.2 MG/DL (ref 7.8–10.44)
CHLORIDE SERPL-SCNC: 107 MMOL/L (ref 98–107)
CO2 SERPL-SCNC: 19 MMOL/L (ref 23–31)
CREAT CL PREDICTED SERPL C-G-VRATE: 54 ML/MIN (ref 70–130)
EOSINOPHIL # BLD AUTO: 0 THOU/UL (ref 0–0.7)
EOSINOPHIL NFR BLD AUTO: 0.5 % (ref 0–10)
GLUCOSE SERPL-MCNC: 89 MG/DL (ref 80–115)
HGB BLD-MCNC: 9.6 G/DL (ref 12–16)
LYMPHOCYTES # BLD: 1 THOU/UL (ref 1.2–3.4)
LYMPHOCYTES NFR BLD AUTO: 13.5 % (ref 21–51)
MCH RBC QN AUTO: 30.4 PG (ref 27–31)
MCV RBC AUTO: 90.1 FL (ref 78–98)
MONOCYTES # BLD AUTO: 0.4 THOU/UL (ref 0.11–0.59)
MONOCYTES NFR BLD AUTO: 5 % (ref 0–10)
NEUTROPHILS # BLD AUTO: 6 THOU/UL (ref 1.4–6.5)
NEUTROPHILS NFR BLD AUTO: 80.8 % (ref 42–75)
PLATELET # BLD AUTO: 274 THOU/UL (ref 130–400)
POTASSIUM SERPL-SCNC: 3 MMOL/L (ref 3.5–5.1)
RBC # BLD AUTO: 3.15 MILL/UL (ref 4.2–5.4)
SODIUM SERPL-SCNC: 139 MMOL/L (ref 136–145)
WBC # BLD AUTO: 7.4 THOU/UL (ref 4.8–10.8)

## 2019-10-20 RX ADMIN — Medication SCH ML: at 20:42

## 2019-10-20 RX ADMIN — POTASSIUM CHLORIDE SCH MLS: 14.9 INJECTION, SOLUTION INTRAVENOUS at 23:33

## 2019-10-20 RX ADMIN — POTASSIUM CHLORIDE SCH MLS: 14.9 INJECTION, SOLUTION INTRAVENOUS at 21:16

## 2019-10-20 RX ADMIN — Medication SCH ML: at 08:00

## 2019-10-20 NOTE — PRG
DATE OF SERVICE:  10/20/2019



SUBJECTIVE:  A 69-year-old female.  This morning, still encephalopathic.



OBJECTIVE:  VITAL SIGNS:  Pulse 94, blood pressure 140/90, saturations 96% and

temperature 39. 

CHEST:  Decreased breath sounds.  No wheezing. 

CARDIAC:  Normal S1 and S2.  No gallops. 

ABDOMEN:  No masses.



IMPRESSION:  Urinary tract infection, pneumonia, respiratory failure, and

encephalopathy. 



PLAN:  At this stage, she needs nutrition and we will consider putting a Dobbhoff

tube today. 



Minimize sedation.  Continue antibiotics, PT and supportive care.







Job ID:  [id number]

## 2019-10-20 NOTE — PDOC.HOSPP
- Subjective


Subjective: 





Still non-verbal. 





- Objective


Vital Signs & Weight: 


 Vital Signs (12 hours)











  Temp Pulse Resp Pulse Ox


 


 10/20/19 20:00  98.0 F   


 


 10/20/19 17:10   94  30 H  100


 


 10/20/19 15:00  97.6 F   


 


 10/20/19 11:00  98.3 F   








 Weight











Admit Weight                   128 lb


 


Weight                         128 lb 11.999 oz











 Most Recent Monitor Data











Heart Rate from ECG            101


 


NIBP                           109/52


 


NIBP BP-Mean                   71


 


Respiration from ECG           22


 


SpO2                           95














I&O: 


 











 10/19/19 10/20/19 10/21/19





 06:59 06:59 06:59


 


Intake Total 1503 2423 1193


 


Output Total 830 114 245


 


Balance 850 5618 499











Result Diagrams: 


 10/20/19 03:40





 10/20/19 03:40





Hospitalist ROS





- Medication


Medications: 


Active Medications











Generic Name Dose Route Start Last Admin





  Trade Name Freq  PRN Reason Stop Dose Admin


 


Albuterol/Ipratropium  3 ml  10/16/19 17:49  10/20/19 17:10





  Duoneb  NEB   3 ml





  Q6H PRN   Administration





  .WHEEZING   





     





     





     


 


Cefepime HCl 1 gm/ Sodium  100 mls @ 200 mls/hr  10/16/19 09:00  10/20/19 20:42





  Chloride  IVPB   100 mls





  Q12HR GABRIELLE   Administration





     





     





     





     


 


Sodium Bicarbonate 70 meq/  1,070 mls @ 100 mls/hr  10/17/19 07:15  10/20/19 11:

50





  Sodium Chloride  IV   1,070 mls





  .[id number] GABRIELLE   Administration





     





     





     





     


 


Lorazepam  0.5 mg  10/15/19 22:52  10/18/19 11:14





  Ativan  SLOW IVP   0.5 mg





  Q3H PRN   Administration





  Agitation   





     





     





     


 


Nicotine  21 mg  10/17/19 09:00  10/20/19 08:01





  Nicoderm Patch  TD   21 mg





  DAILY GABRIELLE   Administration





     





     





     





     


 


Sodium Chloride  10 ml  10/17/19 21:00  10/20/19 20:42





  Flush - Normal Saline  IVF   10 ml





  Q12HR GABRIELLE   Administration





     





     





     





     


 


Ziprasidone  20 mg  10/18/19 07:30  10/19/19 00:58





  Geodon  IM   20 mg





  Q4H PRN   Administration





  Agitation   





     





     





     














- Exam


General Appearance: NAD


Heart: RRR, no murmur, no gallops, no rubs, normal peripheral pulses


Respiratory: no rales, no ronchi


Gastrointestinal: soft, non-tender, non-distended, normal bowel sounds, no 

palpable masses, no hepatomegaly, no splenomegaly, no bruit


Extremities: no cyanosis, no clubbing, no edema


Extremities - other findings: Large areas of ecchymoses BLE's


Skin: normal turgor


Neurological - other findings: Responds to voice, makes eye contact, tracks a 

little.  





Hosp A/P


(1) Sepsis


Code(s): A41.9 - SEPSIS, UNSPECIFIED ORGANISM   Status: Acute   





(2) UTI (urinary tract infection)


Status: Acute   





(3) Anemia


Code(s): D64.9 - ANEMIA, UNSPECIFIED   Status: Acute   





(4) Myocardial infarction


Code(s): I21.9 - ACUTE MYOCARDIAL INFARCTION, UNSPECIFIED   Status: Acute   


Qualifiers: 


   Myocardial infarction type: type 2   Qualified Code(s): I21.A1 - Myocardial 

infarction type 2   





(5) Acidosis


Code(s): E87.2 - ACIDOSIS   Status: Acute   





(6) Toxic metabolic encephalopathy


Code(s): G92 - TOXIC ENCEPHALOPATHY   Status: Acute   





(7) Ecchymosis


Code(s): R58 - HEMORRHAGE, NOT ELSEWHERE CLASSIFIED   Status: Acute   





(8) Hypokalemia


Code(s): E87.6 - HYPOKALEMIA   Status: Acute   





- Plan





Encephalopathy may be due to sepsis but that should be much better by now.


Could be having some WD from muscle relaxers that she had listed on her med 

list. 


Spoke with patient's son, Geronimo Hernandez.  He indicates she would have very 

limited to access to any muscle relaxers. 


Off Precedex.


Concerned for possibility of traumatic brain injury given the ecchymoses on the 

LE's.  Son does not have any insight to how she got the bruises.   She 

apparently take a lot of aspirin.


Need an MRI when she is medically stable to do so and when she can be still 

long enough to do so. 





Sepsis appears to be related to UTI.


Cultures in growing pan-sensitive Klebsiella.  


Continue IV abx. 





Has some abdominal tenderness felt to be present initially. 


Seems to be better now. 





Severe anemia.  


Last known level was normal.


Guaiac stools negative.


May be related to the hemorrhages of the LE's and acute blood loss.


Transfused 2nd unit PRBC.


Subsequently stable. 





Acidosis presumed due to sepsis.


Lactic acid is improving.





Ecchymosis on LE's.  


Etiology is unclear.  Son had no additional insight. 


Present on admission. 


Coags are relatively normal.


May be traumatic related to agitation.





Trops elevated likely from NSTEMI Type II due to sepsis.


Cardiology following. 





Discussed the situation with the patient's son Geronimo Hernandez.  His wife has just 

received a transplant and they are trying to avoid hospitals so communicating 

by phone.

## 2019-10-21 LAB
ANION GAP SERPL CALC-SCNC: 14 MMOL/L (ref 10–20)
BUN SERPL-MCNC: 21 MG/DL (ref 9.8–20.1)
CALCIUM SERPL-MCNC: 7.4 MG/DL (ref 7.8–10.44)
CHLORIDE SERPL-SCNC: 106 MMOL/L (ref 98–107)
CO2 SERPL-SCNC: 25 MMOL/L (ref 23–31)
CREAT CL PREDICTED SERPL C-G-VRATE: 60 ML/MIN (ref 70–130)
GLUCOSE SERPL-MCNC: 100 MG/DL (ref 80–115)
HGB BLD-MCNC: 8.5 G/DL (ref 12–16)
MCH RBC QN AUTO: 30.4 PG (ref 27–31)
MCV RBC AUTO: 90.7 FL (ref 78–98)
MDIFF COMPLETE?: YES
PLATELET # BLD AUTO: 100 THOU/UL (ref 130–400)
POTASSIUM SERPL-SCNC: 3.2 MMOL/L (ref 3.5–5.1)
RBC # BLD AUTO: 2.8 MILL/UL (ref 4.2–5.4)
SODIUM SERPL-SCNC: 142 MMOL/L (ref 136–145)
WBC # BLD AUTO: 6.5 THOU/UL (ref 4.8–10.8)

## 2019-10-21 RX ADMIN — Medication SCH ML: at 21:01

## 2019-10-21 RX ADMIN — Medication SCH ML: at 08:23

## 2019-10-21 NOTE — PRG
DATE OF SERVICE:  10/21/2019



SUBJECTIVE:  She remains in the ICU, awaiting a bed in the intermediate care unit.

She was taken off the Precedex drip over the weekend.  She is now looking around

more.  She was able to vocalize some things to me.  She was able to follow commands. 



PHYSICAL EXAMINATION:

VITAL SIGNS:  Her O2 sat Is 99%, blood pressure 137/93, pulse 100, respiratory rate

15, and temperature is 98.7. 

HEENT:  Pupils are reactive.  She has a left naris, NG tube.  Oropharynx is clear. 

NECK:  No adenopathy or JVD. 

LUNGS:  Clear to auscultation anteriorly. 

CARDIAC:  S1, S2.  Slightly tachycardic. 

ABDOMEN:  Soft, nontender. 

EXTREMITIES:  No edema.



LABORATORY DATA:  White blood cell count 6.5, hematocrit 25.4, and platelet count

100.  Sodium 142, potassium 3.2, chloride 106, CO2 of 25, BUN 21, creatinine 0.8,

and glucose 100. 



ASSESSMENT:  

1. Encephalopathy - etiology not clear, but thought due to sepsis and also possibly

some type of substance withdrawal. 

2. Urinary tract infection with Klebsiella.

3. Anemia.

4. Thrombocytopenia.

5. Protein-calorie malnutrition.



PLAN:  

1. She is stable enough to go to intermediate care when bed becomes available

continuing antibiotic therapy. 

2. I will go ahead and stop the bicarbonate drip, and she will receive fluids in the

form of her tube feeds. 





Job ID:  [id number]

## 2019-10-21 NOTE — PRG
DATE OF SERVICE:  10/19/2019



SUBJECTIVE:  Remains in the ICU. A 69-year-old female, encephalopathic, not able to

get much information. 



OBJECTIVE:  VITAL SIGNS:  Pulse 92, blood pressure 120/59,  __________ 

CHEST:  Decreased breath sounds, rhonchi. 

CARDIAC: Normal S1 and S2.  No gallops. 

ABDOMEN: Soft.



LABORATORY DATA:  White count 10,000, platelet count normal.  Lytes are normal.



ASSESSMENT:  

1. Klebsiella urinary tract infection.

2. Encephalopathy.

3. Respiratory failure.



PLAN:  At this stage nothing additional to offer. 



She needs nutrition and PT. Continue surveillance in the ICU.







Job ID:  [id number]

## 2019-10-21 NOTE — PDOC.HOSPP
- Subjective


Subjective: 





A little more interactive today.  Can answer a few questions.  Indicates she 

does not know what caused the bruises in her legs. 





- Objective


Vital Signs & Weight: 


 Vital Signs (12 hours)











  Temp Pulse Ox


 


 10/21/19 11:00  97.8 F 


 


 10/21/19 07:54   97


 


 10/21/19 07:00  98.7 F 








 Weight











Admit Weight                   128 lb


 


Weight                         128 lb 11.999 oz











 Most Recent Monitor Data











Heart Rate from ECG            94


 


NIBP                           140/80


 


NIBP BP-Mean                   100


 


Respiration from ECG           28


 


SpO2                           96














I&O: 


 











 10/20/19 10/21/19 10/22/19





 06:59 06:59 06:59


 


Intake Total 2423 2950 572


 


Output Total 458 380 175


 


Balance 1965 2570 397











Result Diagrams: 


 10/21/19 04:10





 10/21/19 04:10





Hospitalist ROS





- Medication


Medications: 


Active Medications











Generic Name Dose Route Start Last Admin





  Trade Name Freq  PRN Reason Stop Dose Admin


 


Albuterol/Ipratropium  3 ml  10/16/19 17:49  10/20/19 17:10





  Duoneb  NEB   3 ml





  Q6H PRN   Administration





  .WHEEZING   





     





     





     


 


Cefepime HCl 1 gm/ Sodium  100 mls @ 200 mls/hr  10/16/19 09:00  10/21/19 08:21





  Chloride  IVPB   100 mls





  Q12HR GABRIELLE   Administration





     





     





     





     


 


Lorazepam  0.5 mg  10/15/19 22:52  10/18/19 11:14





  Ativan  SLOW IVP   0.5 mg





  Q3H PRN   Administration





  Agitation   





     





     





     


 


Nicotine  21 mg  10/17/19 09:00  10/21/19 08:22





  Nicoderm Patch  TD   21 mg





  DAILY GABRIELLE   Administration





     





     





     





     


 


Sodium Chloride  10 ml  10/17/19 21:00  10/21/19 08:23





  Flush - Normal Saline  IVF   10 ml





  Q12HR GABRIELLE   Administration





     





     





     





     


 


Ziprasidone  20 mg  10/18/19 07:30  10/19/19 00:58





  Geodon  IM   20 mg





  Q4H PRN   Administration





  Agitation   





     





     





     














- Exam


General Appearance: NAD, awake alert


Neck: supple, symmetric, no JVD, no thyromegaly, no lymphadenopathy, no carotid 

bruit


Heart: RRR, no murmur, no gallops, no rubs, normal peripheral pulses


Respiratory: CTAB, no wheezes, no ronchi, normal chest expansion, no tachypnea, 

normal percussion


Respiratory - other findings: scattered rales. 


Gastrointestinal: soft, non-tender, non-distended, normal bowel sounds, no 

palpable masses, no hepatomegaly, no splenomegaly, no bruit


Skin: normal turgor


Skin - other findings: Large areas of ecchymoses on the LE's.


Neurological - other findings: LUE with some proximal movement, but hand not 

moving.  


Musculoskeletal - other findings: LE's symmetrically weak.  4/5


Psychiatric - other findings: Has a slightly anxious look.  





Hosp A/P


(1) Sepsis


Code(s): A41.9 - SEPSIS, UNSPECIFIED ORGANISM   Status: Acute   





(2) UTI (urinary tract infection)


Status: Acute   





(3) Anemia


Code(s): D64.9 - ANEMIA, UNSPECIFIED   Status: Acute   





(4) Myocardial infarction


Code(s): I21.9 - ACUTE MYOCARDIAL INFARCTION, UNSPECIFIED   Status: Acute   


Qualifiers: 


   Myocardial infarction type: type 2   Qualified Code(s): I21.A1 - Myocardial 

infarction type 2   





(5) Acidosis


Code(s): E87.2 - ACIDOSIS   Status: Acute   





(6) Toxic metabolic encephalopathy


Code(s): G92 - TOXIC ENCEPHALOPATHY   Status: Acute   





(7) Ecchymosis


Code(s): R58 - HEMORRHAGE, NOT ELSEWHERE CLASSIFIED   Status: Acute   





(8) Hypokalemia


Code(s): E87.6 - HYPOKALEMIA   Status: Acute   





- Plan





Encephalopathy may be due to sepsis but that should be much better by now.


Could be having some WD from muscle relaxers that she had listed on her med 

list. 


Spoke with patient's son, Geronimo Hernandez.  He indicates she would have very 

limited to access to any muscle relaxers. 


Off Precedex.


Concerned for possibility of traumatic brain injury given the ecchymoses on the 

LE's.  Son does not have any insight to how she got the bruises.   She 

apparently take a lot of aspirin.


Need an MRI when she is medically stable to do so and when she can be still 

long enough to do so. 


Discussed with Dr. Sesay.  Will try to get a repeat CT today given the LUE 

weakness and encephalopathy.


Encephalopathy is getting better.  





Sepsis appears to be related to UTI.


Cultures in growing pan-sensitive Klebsiella.  


Continue IV abx.  





Severe anemia.  


Last known level was normal.


Guaiac stools negative.


May be related to the hemorrhages of the LE's and acute blood loss.


Transfused 2nd unit PRBC.


Subsequently stable. 





Acidosis presumed due to sepsis.


Lactic acid improved.





Ecchymosis on LE's.  


Etiology is unclear.  Son had no additional insight. 


Present on admission. 


Coags are relatively normal.


May be traumatic related to agitation.





Trops elevated likely from NSTEMI Type II due to sepsis.


Cardiology following. 





Discussed the situation with the patient's son Geronimo Hernandez.  His wife has just 

received a transplant and they are trying to avoid hospitals so communicating 

by phone.

## 2019-10-21 NOTE — PDOC.CPN
- Subjective


Date: 10/21/19


Time: 17:49


Interval history: 





No new issues. She is answering questions now but remains confused. 





- Review of Systems


ROS unobtainable: due to mental status





- Objective


Allergies/Adverse Reactions: 


 Allergies











Allergy/AdvReac Type Severity Reaction Status Date / Time


 


diphenhydramine Allergy   Verified 04/28/18 23:35





[facility]     











Visit Medications: 


 Current Medications





Albuterol/Ipratropium (Duoneb)  3 ml NEB Q6H PRN


   PRN Reason: .WHEEZING


   Last Admin: 10/20/19 17:10 Dose:  3 ml


Cefepime HCl 1 gm/ Sodium (Chloride)  100 mls @ 200 mls/hr IVPB Q12HR GABRIELLE


   Last Admin: 10/21/19 08:21 Dose:  100 mls


Lorazepam (Ativan)  0.5 mg SLOW IVP Q3H PRN


   PRN Reason: Agitation


   Last Admin: 10/18/19 11:14 Dose:  0.5 mg


Nicotine (Nicoderm Patch)  21 mg TD DAILY GABRIELLE


   Last Admin: 10/21/19 08:22 Dose:  21 mg


Sodium Chloride (Flush - Normal Saline)  10 ml IVF Q12HR GABRIELLE


   Last Admin: 10/21/19 08:23 Dose:  10 ml


Sodium Chloride (Flush - Normal Saline)  10 ml IVF PRN PRN


   PRN Reason: Saline Flush


Sterile Water (Water For Injection)  1.2 ml FS PRN PRN


   PRN Reason: RECONSTITUTION


Ziprasidone (Geodon)  20 mg IM Q4H PRN


   PRN Reason: Agitation


   Last Admin: 10/19/19 00:58 Dose:  20 mg








Vital Signs & Weight: 


 Vital Signs











  Temp Pulse Ox


 


 10/21/19 15:00  98.0 F 


 


 10/21/19 11:00  97.8 F 


 


 10/21/19 07:54   97


 


 10/21/19 07:00  98.7 F 








 











Admit Weight                   128 lb


 


Weight                         128 lb 11.999 oz

















- Physical Exam


General: no apparent distress


HEENT: mucus membranes moist


Neck: supple neck


Cardiac: regular rate and rhythm


Lungs: clear to auscultation


Neuro: no lateralizing findings


Abdomen: active bowel sounds, soft, non-tender


Extremities: no edema


Skin: clear


Musculoskeletal: no pain





- Labs


Result Diagrams: 


 10/21/19 04:10





 10/21/19 04:10


 Troponin/CKMB











CK-MB (CK-2)  9.7 ng/mL (0-6.6)  H*  10/15/19  17:51    


 


Troponin I  1.302 ng/mL (< 0.028)  H*  10/15/19  21:30    














- Telemetry


Sinus rhythms and dysrhythmias: sinus rhythm





- Assessment/Plan


Assessment/Plan: 





1. AMS


2. Type 2 demand ischemia. 


3. Septic shock


4. UTI sepsis. 


5. Anemia





PLAN:


- No indication for full anticoagulation.


- Likely demand ischemia from sepsis. 


- Echo showed apex is akinetic, most likely a takotsubo variant but may be 

related to underlying CAD, EF at 45-50%.


- Most likely secondary issues at this point and would continue with 

conservative therapy for now.

## 2019-10-21 NOTE — CT
CT HEAD WITHOUT CONTRAST:

 

INDICATIONS:

Encephalopathy. Left-sided weakness.

 

COMPARISON:

Recent head CT from 10/15/2019.

 

FINDINGS:

Cortical volume loss is again noted. No evidence of intracranial mass or hemorrhage. No evidence of a
cute infarct. There are chronic ischemic changes which appear stable. No interval change from the rec
ent exam is noted.

 

IMPRESSION:

No acute findings.

 

POS: ANA PAULA

## 2019-10-22 LAB
ANION GAP SERPL CALC-SCNC: 11 MMOL/L (ref 10–20)
BASOPHILS # BLD AUTO: 0 THOU/UL (ref 0–0.2)
BASOPHILS NFR BLD AUTO: 0.5 % (ref 0–1)
BUN SERPL-MCNC: 22 MG/DL (ref 9.8–20.1)
CALCIUM SERPL-MCNC: 8.1 MG/DL (ref 7.8–10.44)
CHLORIDE SERPL-SCNC: 110 MMOL/L (ref 98–107)
CO2 SERPL-SCNC: 27 MMOL/L (ref 23–31)
CREAT CL PREDICTED SERPL C-G-VRATE: 63 ML/MIN (ref 70–130)
EOSINOPHIL # BLD AUTO: 0.1 THOU/UL (ref 0–0.7)
EOSINOPHIL NFR BLD AUTO: 1.4 % (ref 0–10)
GLUCOSE SERPL-MCNC: 134 MG/DL (ref 80–115)
HGB BLD-MCNC: 9.6 G/DL (ref 12–16)
LYMPHOCYTES # BLD: 1.1 THOU/UL (ref 1.2–3.4)
LYMPHOCYTES NFR BLD AUTO: 12.7 % (ref 21–51)
MCH RBC QN AUTO: 29.9 PG (ref 27–31)
MCV RBC AUTO: 93.9 FL (ref 78–98)
MDIFF COMPLETE?: YES
MONOCYTES # BLD AUTO: 0.5 THOU/UL (ref 0.11–0.59)
MONOCYTES NFR BLD AUTO: 5.2 % (ref 0–10)
NEUTROPHILS # BLD AUTO: 6.9 THOU/UL (ref 1.4–6.5)
NEUTROPHILS NFR BLD AUTO: 80.2 % (ref 42–75)
PLATELET # BLD AUTO: 209 THOU/UL (ref 130–400)
POLYCHROMASIA BLD QL SMEAR: (no result) (100X)
POTASSIUM SERPL-SCNC: 3.2 MMOL/L (ref 3.5–5.1)
RBC # BLD AUTO: 3.19 MILL/UL (ref 4.2–5.4)
SODIUM SERPL-SCNC: 145 MMOL/L (ref 136–145)
WBC # BLD AUTO: 8.6 THOU/UL (ref 4.8–10.8)

## 2019-10-22 RX ADMIN — Medication SCH ML: at 08:55

## 2019-10-22 RX ADMIN — Medication SCH ML: at 20:14

## 2019-10-22 NOTE — PDOC.HOSPP
- Subjective


Subjective: 





Talks a bit, but still largely non-verbal. 





- Objective


Vital Signs & Weight: 


 Vital Signs (12 hours)











  Temp Pulse Ox


 


 10/22/19 08:00  97.7 F  96


 


 10/22/19 04:00  98.6 F 


 


 10/22/19 00:00  98.3 F 








 Weight











Admit Weight                   128 lb


 


Weight                         128 lb 11.999 oz











 Most Recent Monitor Data











Heart Rate from ECG            93


 


NIBP                           153/82


 


NIBP BP-Mean                   105


 


Respiration from ECG           16


 


SpO2                           98














I&O: 


 











 10/21/19 10/22/19 10/23/19





 06:59 06:59 06:59


 


Intake Total 2950 1852 30


 


Output Total 380 780 85


 


Balance 2570 1072 -55











Result Diagrams: 


 10/22/19 07:10





 10/22/19 07:10





Hospitalist ROS





- Medication


Medications: 


Active Medications











Generic Name Dose Route Start Last Admin





  Trade Name Freq  PRN Reason Stop Dose Admin


 


Albuterol/Ipratropium  3 ml  10/16/19 17:49  10/20/19 17:10





  Duoneb  NEB   3 ml





  Q6H PRN   Administration





  .WHEEZING   





     





     





     


 


Cefepime HCl 1 gm/ Sodium  100 mls @ 200 mls/hr  10/16/19 09:00  10/22/19 08:53





  Chloride  IVPB   100 mls





  Q12HR GABRIELLE   Administration





     





     





     





     


 


Nicotine  21 mg  10/17/19 09:00  10/22/19 08:54





  Nicoderm Patch  TD   21 mg





  DAILY GABRIELLE   Administration





     





     





     





     


 


Sodium Chloride  10 ml  10/17/19 21:00  10/22/19 08:55





  Flush - Normal Saline  IVF   10 ml





  Q12HR GABRIELLE   Administration





     





     





     





     














- Exam


General Appearance: NAD, awake alert


Heart: RRR, no murmur, no gallops, no rubs, normal peripheral pulses


Respiratory: CTAB, no wheezes, no ronchi, normal chest expansion, no tachypnea, 

normal percussion, rales


Gastrointestinal: soft, non-tender, non-distended, normal bowel sounds, no 

palpable masses, no hepatomegaly, no splenomegaly, no bruit


Extremities: no cyanosis, no clubbing


Extremities - other findings: Diffuse ecchymoses starting to fade. 


Skin: normal turgor


Musculoskeletal: generalized weakness


Musculoskeletal - other findings: Not moving left arm today. 


Psychiatric - other findings: Encephalopathic.





Hosp A/P


(1) Sepsis


Code(s): A41.9 - SEPSIS, UNSPECIFIED ORGANISM   Status: Acute   





(2) UTI (urinary tract infection)


Status: Acute   





(3) Anemia


Code(s): D64.9 - ANEMIA, UNSPECIFIED   Status: Acute   





(4) Myocardial infarction


Code(s): I21.9 - ACUTE MYOCARDIAL INFARCTION, UNSPECIFIED   Status: Acute   


Qualifiers: 


   Myocardial infarction type: type 2   Qualified Code(s): I21.A1 - Myocardial 

infarction type 2   





(5) Acidosis


Code(s): E87.2 - ACIDOSIS   Status: Acute   





(6) Toxic metabolic encephalopathy


Code(s): G92 - TOXIC ENCEPHALOPATHY   Status: Acute   





(7) Ecchymosis


Code(s): R58 - HEMORRHAGE, NOT ELSEWHERE CLASSIFIED   Status: Acute   





(8) Hypokalemia


Code(s): E87.6 - HYPOKALEMIA   Status: Acute   





- Plan





Encephalopathy.


Source is still not entirely clear.  Possibly sepsis.  Cannot exclude 

possibility of closed head injury given the bruising/ecchymoses on the LE's.  


Repeat CT head was negative.  


Needs MRI when she can get it.  Still unlikely to be still enough to manage 

that now. 


Could be having some WD from muscle relaxers that she had listed on her med 

list. 


Spoke with patient's son, Geronimo Hernandez.  He indicates she would have very 

limited to access to any muscle relaxers. 


Encephalopathy is getting a little better slowly, but still significant.  

Ammonia level normal. 





Sepsis appears to be related to UTI.


Cultures in growing pan-sensitive Klebsiella.  


Continue IV abx.  





Severe anemia.  


Last known level was normal.


Guaiac stools negative.


May be related to the hemorrhages of the LE's and acute blood loss.


Transfused 2nd unit PRBC.


Subsequently stable. 





Acidosis presumed due to sepsis.


Lactic acid improved.





Ecchymosis on LE's.  


Etiology is unclear.  Son had no additional insight. 


Present on admission. 


Coags are relatively normal.


May be traumatic related to agitation.





Trops elevated likely from NSTEMI Type II due to sepsis.


Cardiology following.

## 2019-10-22 NOTE — PRG
DATE OF SERVICE:  10/22/2019



SUBJECTIVE:  I find her a little more communicative than what she has been.  She can

do some yes or no answers.  She still cannot verbalize very well. 



OBJECTIVE:  VITAL SIGNS:  Her temperature 97.7, pulse 94, blood pressure 136/80, and

O2 saturation 96%.  Intake for 24 hours 1852 and output 780, all of that urine. 

HEENT:  Unchanged. 

NECK:  No adenopathy or JVD. 

LUNGS:  Clear anteriorly. 

CARDIOVASCULAR:  S1 and S2.  Regular. 

ABDOMEN:  Soft. 

EXTREMITIES:  Severe muscle wasting.



LABORATORY DATA:  White blood cell count 8.6, hematocrit 30, and platelet count 209.

 Sodium 145, potassium 3.2, chloride 110, CO2 of 27, BUN 22, creatinine 0.7, and

glucose 134. 



ASSESSMENT:  

1. Encephalopathy.

2. Hypokalemia.

3. Systolic and diastolic cardiac dysfunction by echo.

4. Improved thrombocytopenia.

5. Protein-calorie malnutrition.

6. Urinary tract infection with Klebsiella.



PLAN:  

1. Transfer to Medical.

2. Initiate physical therapy.

3. Check ammonia level.

4. Replace potassium.







Job ID:  [id number]

## 2019-10-22 NOTE — PDOC.CPN
- Subjective


Date: 10/22/19


Time: 17:42


Interval history: 





No new issues. Remains confused. 





- Review of Systems


ROS unobtainable: due to mental status





- Objective


Allergies/Adverse Reactions: 


 Allergies











Allergy/AdvReac Type Severity Reaction Status Date / Time


 


diphenhydramine Allergy   Verified 04/28/18 23:35





[From [facility]]     











Visit Medications: 


 Current Medications





Albuterol/Ipratropium (Duoneb)  3 ml NEB Q6H PRN


   PRN Reason: .WHEEZING


   Last Admin: 10/20/19 17:10 Dose:  3 ml


Lipase/Protease/Amylase ([place] [address])  1 cap FS .PER PROTOCOL PRN


   PRN Reason: TUBE OCCLUSION PROTOCOL


Cefepime HCl 1 gm/ Sodium (Chloride)  100 mls @ 200 mls/hr IVPB Q12HR GABRIELLE


   Last Admin: 10/22/19 08:53 Dose:  100 mls


Nicotine (Nicoderm Patch)  21 mg TD DAILY GABRIELLE


   Last Admin: 10/22/19 08:54 Dose:  21 mg


Sodium Bicarbonate (Bicarbonate, Sodium)  650 mg PER TUBE .PER PROTOCOL PRN


   PRN Reason: ENTERAL TUBE OCCLUSION


Sodium Chloride (Flush - Normal Saline)  10 ml IVF Q12HR GABRIELLE


   Last Admin: 10/22/19 08:55 Dose:  10 ml


Sodium Chloride (Flush - Normal Saline)  10 ml IVF PRN PRN


   PRN Reason: Saline Flush


Sterile Water (Water For Injection)  1.2 ml FS PRN PRN


   PRN Reason: RECONSTITUTION








Vital Signs & Weight: 


 Vital Signs











  Temp Pulse Ox


 


 10/22/19 16:00  97.9 F 


 


 10/22/19 12:00  98.1 F 


 


 10/22/19 08:00  97.7 F  96








 











Admit Weight                   128 lb


 


Weight                         128 lb 11.999 oz

















- Physical Exam


General: no apparent distress


HEENT: mucus membranes moist


Neck: supple neck, midline trachea


Cardiac: regular rate and rhythm, no murmur


Lungs: clear to auscultation


Neuro: no lateralizing findings


Abdomen: active bowel sounds, soft, non-tender


Extremities: no edema


Skin: clear


Musculoskeletal: no pain





- Labs


Result Diagrams: 


 10/22/19 07:10





 10/22/19 07:10


 Troponin/CKMB











CK-MB (CK-2)  9.7 ng/mL (0-6.6)  H*  10/15/19  17:51    


 


Troponin I  1.302 ng/mL (< 0.028)  H*  10/15/19  21:30    














- Telemetry


Sinus rhythms and dysrhythmias: sinus rhythm





- Assessment/Plan


Assessment/Plan: 





1. AMS


2. Type 2 demand ischemia. 


3. Septic shock


4. UTI sepsis. 


5. Anemia





PLAN:


- No indication for full anticoagulation.


- Likely demand ischemia from sepsis. 


- Echo showed apex is akinetic, most likely a takotsubo variant but may be 

related to underlying CAD, EF at 45-50%.


- Most likely secondary issues at this point and would continue with 

conservative therapy for now. 


- Not a candidate for any invasive interventions art this time. 


- Will follow from a distance. 


- Please call with any questions.

## 2019-10-23 LAB
ANION GAP SERPL CALC-SCNC: 13 MMOL/L (ref 10–20)
ANISOCYTOSIS BLD QL SMEAR: (no result) (100X)
BUN SERPL-MCNC: 26 MG/DL (ref 9.8–20.1)
CALCIUM SERPL-MCNC: 7.8 MG/DL (ref 7.8–10.44)
CHLORIDE SERPL-SCNC: 112 MMOL/L (ref 98–107)
CO2 SERPL-SCNC: 24 MMOL/L (ref 23–31)
CREAT CL PREDICTED SERPL C-G-VRATE: 63 ML/MIN (ref 70–130)
GLUCOSE SERPL-MCNC: 109 MG/DL (ref 80–115)
HGB BLD-MCNC: 9.4 G/DL (ref 12–16)
MCH RBC QN AUTO: 30.5 PG (ref 27–31)
MCV RBC AUTO: 95.6 FL (ref 78–98)
MDIFF COMPLETE?: YES
PLATELET # BLD AUTO: 177 THOU/UL (ref 130–400)
POIKILOCYTOSIS BLD QL SMEAR: (no result) (100X)
POTASSIUM SERPL-SCNC: 4 MMOL/L (ref 3.5–5.1)
RBC # BLD AUTO: 3.09 MILL/UL (ref 4.2–5.4)
SODIUM SERPL-SCNC: 145 MMOL/L (ref 136–145)
WBC # BLD AUTO: 6.9 THOU/UL (ref 4.8–10.8)

## 2019-10-23 RX ADMIN — Medication SCH ML: at 20:34

## 2019-10-23 RX ADMIN — Medication SCH ML: at 08:39

## 2019-10-23 NOTE — PDOC.HOSPP
- Subjective


Encounter Date: 10/23/19


Encounter Time: 10:54


Subjective: 


70 y/o female with HTN, DJD admitted with AMS of combativeness. Found to have 

severe sepsis due to UTI and started on IVF and antibiotics. Found to have 

elevated troponin thought to be demand ischemia but echo showed reduced EF of 45

-50, diastolic dysfunction, wall motion abnormalities and pulmonary HTN. 

Improved and now on the floor. Mental staus is improving but she is still 

confused. 





- Objective


Vital Signs & Weight: 


 Vital Signs (12 hours)











  Temp Pulse Resp BP Pulse Ox


 


 10/23/19 07:04  96.2 F L  93  20  106/68  100


 


 10/23/19 05:09   102 H  24 H   100


 


 10/23/19 04:00  98.7 F  85  18   100








 Weight











Admit Weight                   128 lb


 


Weight                         128 lb 11.999 oz











 Most Recent Monitor Data











Heart Rate from ECG            82


 


NIBP                           153/102


 


NIBP BP-Mean                   119


 


Respiration from ECG           25


 


SpO2                           94














I&O: 


 











 10/22/19 10/23/19 10/24/19





 06:59 06:59 06:59


 


Intake Total 1852 1421 


 


Output Total 780 750 


 


Balance 1072 671 











Result Diagrams: 


 10/23/19 11:07





 10/23/19 11:07





Hospitalist ROS





- Medication


Medications: 


Active Medications











Generic Name Dose Route Start Last Admin





  Trade Name Freq  PRN Reason Stop Dose Admin


 


Albuterol/Ipratropium  3 ml  10/16/19 17:49  10/23/19 05:09





  Duoneb  NEB   3 ml





  Q6H PRN   Administration





  .WHEEZING   





     





     





     


 


Cefepime HCl 1 gm/ Sodium  100 mls @ 200 mls/hr  10/16/19 09:00  10/23/19 08:38





  Chloride  IVPB   100 mls





  Q12HR GABRIELLE   Administration





     





     





     





     


 


Nicotine  21 mg  10/17/19 09:00  10/23/19 08:39





  Nicoderm Patch  TD   21 mg





  DAILY GABRIELLE   Administration





     





     





     





     


 


Sodium Chloride  10 ml  10/17/19 21:00  10/23/19 08:39





  Flush - Normal Saline  IVF   10 ml





  Q12HR GABRIELLE   Administration





     





     





     





     














- Exam


General Appearance: awake alert


Eye: anicteric sclera


ENT: normocephalic atraumatic


ENT - other findings: NG tube in place


Heart: RRR, murmur present


Respiratory - other findings: fair air entry with transmitted sound and 

scattered rhonchi.


Gastrointestinal: soft, non-tender, non-distended, normal bowel sounds


Extremities - other findings: edema of the extremities as well as sacral area 

noted


Neurological: cranial nerve grossly intact, no focal deficits


Musculoskeletal: generalized weakness, diffuse muscle atrophy


Psychiatric: oriented to person, oriented to place





Hosp A/P


(1) Severe sepsis


Code(s): A41.9 - SEPSIS, UNSPECIFIED ORGANISM; R65.20 - SEVERE SEPSIS WITHOUT 

SEPTIC SHOCK   Status: Acute   





(2) Acute metabolic encephalopathy


Code(s): G93.41 - METABOLIC ENCEPHALOPATHY   Status: Acute   





(3) Acute anemia


Code(s): D64.9 - ANEMIA, UNSPECIFIED   Status: Acute   





(4) Metabolic acidosis


Code(s): E87.2 - ACIDOSIS   Status: Acute   





(5) Rhabdomyolysis


Code(s): M62.82 - RHABDOMYOLYSIS   Status: Acute   





(6) Moderate protein-calorie malnutrition


Code(s): E44.0 - MODERATE PROTEIN-CALORIE MALNUTRITION   Status: Acute   





(7) Generalized edema


Code(s): R60.1 - GENERALIZED EDEMA   Status: Acute   





(8) Pulmonary hypertension


Code(s): I27.20 - PULMONARY HYPERTENSION, UNSPECIFIED   Status: Acute   





(9) Acute combined systolic and diastolic heart failure


Code(s): I50.41 - ACUTE COMBINED SYSTOLIC AND DIASTOLIC (CONGESTIVE) HRT FAIL   

Status: Acute   





(10) Tricuspid regurgitation


Code(s): I07.1 - RHEUMATIC TRICUSPID INSUFFICIENCY   Status: Acute   





(11) Cardiomyopathy


Code(s): I42.9 - CARDIOMYOPATHY, UNSPECIFIED   Status: Acute   





(12) Type 2 AMI (acute myocardial infarction)


Code(s): I21.A1 - MYOCARDIAL INFARCTION TYPE 2   Status: Acute   





(13) Physical deconditioning


Code(s): R53.81 - OTHER MALAISE   Status: Acute   





(14) UTI (urinary tract infection)


Status: Acute   





- Plan


Start low dose diuretic.


Continue tube feeding


Continue other supportive care.


Bronchodilators as needed


PT/OT to continue.


Monitor H/H and electrolytes and address as needed

## 2019-10-23 NOTE — PRG
DATE OF SERVICE:  10/23/2019



SUBJECTIVE:  Ms. Martinez is doing reasonably well and has regained her ability to

speak.  She is oriented to place. 



OBJECTIVE:  VITAL SIGNS:  Her temperature is 96.2, pulse 93, respirations 20, oxygen

saturation 100%, blood pressure 106/68. 

HEENT:  Unremarkable. 

NECK:  No adenopathy or JVD. 

LUNGS:  Clear anteriorly. 

CARDIAC:  S1, S2.  Regular. 

ABDOMEN:  Soft. 

EXTREMITIES:  No edema.



LABORATORY DATA:  Ammonia level yesterday was 26.



ASSESSMENT:  Encephalopathy thought secondary to urinary tract infection.  Mental

status has largely improved today. 



PLAN:  She is continuing antibiotics.  Physical Therapy is evaluating, likely we

will need rehab. 







Job ID:  [id number]

## 2019-10-23 NOTE — CON
DATE OF TELEMEDICINE CONSULTATION:  10/23/2019



RN is Canelo Rodriguez.



CHIEF COMPLAINT:  Altered mental status.



HISTORY OF PRESENT ILLNESS:  The patient is unable to give any medical history 
and

her history was obtained from her medical chart.  The patient seems to have been

admitted on 10/15 with high altered mental status.  The patient was noted to be

combative and has history of degenerative disk disease, L1 compression fracture
, and

there was a questionable left-sided weakness.  Workup was negative so far.  She 
was

also found to be septic secondary to urinary infection and has been in the 
hospital

since then. 



I have reviewed

chart note through the course of admission and the patient has been seen by

Cardiology as well by Dr. Wise, and the patient is noted to remain confused.

There is a diagnosis of demand ischemia secondary to sepsis.  Echo showed apex 
is

akinetic, most likely Takotsubo variant with underlying coronary artery disease 
with

EF of 45% to 50%.  We are not sure what this patient's pre-existing neurological

status is as of now and she is still on antibiotics at this time, and I was

consulted to give my opinion about her neurological status. 



PAST MEDICAL HISTORY:  She has previous medical history of hypertension and

degenerative disk disease with L1 compression fracture. 



PAST SURGICAL HISTORY:  None per chart.



SOCIAL HISTORY:  She smokes 2 packs a day.



FAMILY HISTORY:  Unknown.



ALLERGIES:  BENADRYL.



REVIEW OF SYSTEMS:  Unable to obtain.  The patient is very altered.  



LABORATORY DATA:  White count 10.2, hemoglobin 8.3, hematocrit 25.2, and 
platelets

262.  Chemistry; sodium 145, potassium 3.2, chloride 110, bicarb 27, BUN 22,

creatinine 0.78, glucose 134.  Lactic acid 11.8 on 11/15, currently it is 3.2.  
She

has elevation of troponin I as well.  Liver functions seem to be within normal

except for AST, which is elevated at 78. 



PHYSICAL EXAMINATION:

VITAL SIGNS:  Her blood pressure was 106/68, temperature 96.2, pulse 93. 

GENERAL APPEARANCE:  Thin built, well-nourished lady who seems to be in bed and 
she

has an NG tube in place, and she stays curled up in bed, tries to be 
cooperative. 

CHEST:  Clear vesicular breathing. 

CARDIOVASCULAR:  S1 and S2 heard.  No murmurs. 

ABDOMEN:  Soft. 

NEUROLOGIC:  The patient is not oriented to time or place.  Sometimes she shouts

when we touch her right leg.  She is not consistent with following commands.

Cranial nerves; pupils 2 mm bilaterally.  No facial asymmetry noted.  Tongue

midline.  Motor examination; she moves all extremities.  She moves upper 
extremities

more readily than lower extremities.  She seems to be having some pain in the 
right

lower extremity.  Whenever we touch the right lower extremity, she screams.  In 
the

left, she spontaneously moves her left lower extremity.  Deep tendon reflexes 
were

absent.  Sensory and cerebellar unable to assess. 



IMPRESSION:  The patient is a 69-year-old lady with altered mental status.  Her 
CT

scan of the head shows generalized cerebral atrophy and prior chronic 
microvascular

ischemic changes.  I have also reviewed her CT angiography results from this

admission and the patient did not have any specific intraarterial occlusion, but

there is limitation due to motion.  At this time, I am unable to find a specific

neurological reason for her altered mental status, but I suspect this is

multifactorial in etiology.  I am not sure if we can obtain an MRI scan of the

brain.  If possible, please try to do so. 



RECOMMENDATIONS:  Please try to obtain an MRI scan of the brain if possible.  I 
will

continue to follow along with you. 







Job ID:  [id number]



MTDD

## 2019-10-24 LAB
ANION GAP SERPL CALC-SCNC: 10 MMOL/L (ref 10–20)
BASOPHILS # BLD AUTO: 0 THOU/UL (ref 0–0.2)
BASOPHILS NFR BLD AUTO: 0.1 % (ref 0–1)
BUN SERPL-MCNC: 24 MG/DL (ref 9.8–20.1)
CALCIUM SERPL-MCNC: 8 MG/DL (ref 7.8–10.44)
CHLORIDE SERPL-SCNC: 107 MMOL/L (ref 98–107)
CO2 SERPL-SCNC: 29 MMOL/L (ref 23–31)
CREAT CL PREDICTED SERPL C-G-VRATE: 68 ML/MIN (ref 70–130)
EOSINOPHIL # BLD AUTO: 0.3 THOU/UL (ref 0–0.7)
EOSINOPHIL NFR BLD AUTO: 4.8 % (ref 0–10)
GLUCOSE SERPL-MCNC: 103 MG/DL (ref 80–115)
HGB BLD-MCNC: 9.3 G/DL (ref 12–16)
LYMPHOCYTES # BLD: 1.1 THOU/UL (ref 1.2–3.4)
LYMPHOCYTES NFR BLD AUTO: 16.4 % (ref 21–51)
MCH RBC QN AUTO: 30.2 PG (ref 27–31)
MCV RBC AUTO: 95.1 FL (ref 78–98)
MONOCYTES # BLD AUTO: 0.5 THOU/UL (ref 0.11–0.59)
MONOCYTES NFR BLD AUTO: 8.1 % (ref 0–10)
NEUTROPHILS # BLD AUTO: 4.6 THOU/UL (ref 1.4–6.5)
NEUTROPHILS NFR BLD AUTO: 70.7 % (ref 42–75)
PLATELET # BLD AUTO: 159 THOU/UL (ref 130–400)
POTASSIUM SERPL-SCNC: 3.7 MMOL/L (ref 3.5–5.1)
RBC # BLD AUTO: 3.06 MILL/UL (ref 4.2–5.4)
SODIUM SERPL-SCNC: 142 MMOL/L (ref 136–145)
WBC # BLD AUTO: 6.5 THOU/UL (ref 4.8–10.8)

## 2019-10-24 RX ADMIN — Medication SCH ML: at 08:51

## 2019-10-24 NOTE — PDOC.HOSPP
- Subjective


Encounter Date: 10/24/19


Encounter Time: 09:33


Subjective: 


68 y/o female with HTN, DJD admitted with AMS of combativeness. Found to have 

severe sepsis due to UTI and started on IVF and antibiotics. Found to have 

elevated troponin thought to be demand ischemia but echo showed reduced EF of 45

-50, diastolic dysfunction, wall motion abnormalities and pulmonary HTN. 

Treated with antibiotic and others with improvement. Mental status worsened 

overninght with patient pulling off NG tube hence was restrained. Not as awake 

and conversation like yesterday.





- Objective


Vital Signs & Weight: 


 Vital Signs (12 hours)











  Temp Pulse Resp BP Pulse Ox


 


 10/24/19 12:09  97.8 F  96  20  111/51 L  97


 


 10/24/19 08:00      97


 


 10/24/19 07:45  98.4 F  96  20  133/68  97


 


 10/24/19 03:45  97.9 F  90  20  148/69 H  95








 Weight











Admit Weight                   128 lb


 


Weight                         128 lb 11.999 oz











 Most Recent Monitor Data











Heart Rate from ECG            82


 


NIBP                           153/102


 


NIBP BP-Mean                   119


 


Respiration from ECG           25


 


SpO2                           94














I&O: 


 











 10/23/19 10/24/19 10/25/19





 06:59 06:59 06:59


 


Intake Total 1421 1310 55


 


Output Total 750 2625 


 


Balance 671 -1315 55











Result Diagrams: 


 10/24/19 06:14





 10/24/19 06:14





Hospitalist ROS





- Medication


Medications: 


Active Medications











Generic Name Dose Route Start Last Admin





  Trade Name Freq  PRN Reason Stop Dose Admin


 


Acetaminophen  650 mg  10/23/19 22:28  10/23/19 23:27





  Tylenol  PO   650 mg





  Q4H PRN   Administration





  Fever > 101   





     





     





     


 


Albuterol/Ipratropium  3 ml  10/16/19 17:49  10/23/19 05:09





  Duoneb  NEB   3 ml





  Q6H PRN   Administration





  .WHEEZING   





     





     





     


 


Furosemide  20 mg  10/24/19 09:00  10/24/19 08:45





  Lasix  PO   20 mg





  DAILY GABRIELLE   Administration





     





     





     





     


 


Cefepime HCl 1 gm/ Sodium  100 mls @ 200 mls/hr  10/16/19 09:00  10/24/19 08:45





  Chloride  IVPB   100 mls





  Q12HR GABRIELLE   Administration





     





     





     





     


 


Nicotine  21 mg  10/17/19 09:00  10/24/19 08:46





  Nicoderm Patch  TD   21 mg





  DAILY GABRIELLE   Administration





     





     





     





     


 


Sodium Chloride  10 ml  10/17/19 21:00  10/24/19 08:51





  Flush - Normal Saline  IVF   10 ml





  Q12HR GABRIELLE   Administration





     





     





     





     














- Exam


General Appearance: awake alert


Eye: anicteric sclera


ENT: normocephalic atraumatic


ENT - other findings: NG tube in place


Neck: no JVD


Heart: RRR


Respiratory: no wheezes, no ronchi


Respiratory - other findings: fair air entry with scattered transmitted sound/

crackles. 


Gastrointestinal: soft, non-tender, non-distended, normal bowel sounds


Extremities - other findings: Edema of the both LE and left UE noted


Neurological: cranial nerve grossly intact


Psychiatric: oriented to person





Hosp A/P


(1) Acute metabolic encephalopathy


Code(s): G93.41 - METABOLIC ENCEPHALOPATHY   Status: Acute   





(2) Severe sepsis


Code(s): A41.9 - SEPSIS, UNSPECIFIED ORGANISM; R65.20 - SEVERE SEPSIS WITHOUT 

SEPTIC SHOCK   Status: Acute   





(3) Acute anemia


Code(s): D64.9 - ANEMIA, UNSPECIFIED   Status: Acute   





(4) Metabolic acidosis


Code(s): E87.2 - ACIDOSIS   Status: Acute   





(5) Rhabdomyolysis


Code(s): M62.82 - RHABDOMYOLYSIS   Status: Acute   





(6) Moderate protein-calorie malnutrition


Code(s): E44.0 - MODERATE PROTEIN-CALORIE MALNUTRITION   Status: Acute   





(7) Generalized edema


Code(s): R60.1 - GENERALIZED EDEMA   Status: Acute   





(8) Pulmonary hypertension


Code(s): I27.20 - PULMONARY HYPERTENSION, UNSPECIFIED   Status: Acute   





(9) Acute combined systolic and diastolic heart failure


Code(s): I50.41 - ACUTE COMBINED SYSTOLIC AND DIASTOLIC (CONGESTIVE) HRT FAIL   

Status: Acute   





(10) Tricuspid regurgitation


Code(s): I07.1 - RHEUMATIC TRICUSPID INSUFFICIENCY   Status: Acute   





(11) Cardiomyopathy


Code(s): I42.9 - CARDIOMYOPATHY, UNSPECIFIED   Status: Acute   





(12) Type 2 AMI (acute myocardial infarction)


Code(s): I21.A1 - MYOCARDIAL INFARCTION TYPE 2   Status: Acute   





(13) Physical deconditioning


Code(s): R53.81 - OTHER MALAISE   Status: Acute   





(14) UTI (urinary tract infection)


Status: Acute   





- Plan


Get MRI brain as recommended by Neurology


Continue low dose diuretic and antibiotic.


Continue tube feeding


Continue other supportive care.


Bronchodilators as needed


PT/OT to continue.


Monitor H/H and electrolytes and address as needed


Pulm/critical care, cardiology and Neurology following.

## 2019-10-24 NOTE — MRI
MRI BRAIN WITHOUT CONTRAST:

 

10/24/2019

 

HISTORY:

Altered mental status. Focal weakness.

 

COMPARISON:

None.

 

TECHNIQUE:

Multiplanar, multisequence MR imaging of the brain is provided without contrast.

 

FINDINGS:

There is a small focus of restricted diffusion, consistent with acute infarction within the supralate
ral aspect of the cerebellar vermis, on the left, measuring 6 mm. The gradient echo imaging is marked
ly limited by motion. No obvious acute intracranial hemorrhage is appreciated. Blooming artifact in t
he region of the basal ganglia on the left suggests prior hemorrhage. The axial T2 and FLAIR imaging 
is markedly limited by motion and demonstrates periventricular and deep white matter hyperintensity, 
evidence of small vessel disease. Osseous structures are not well assessed on the basis of motion.

 

IMPRESSION:

Motion limited examination demonstrating a 6-7 mm focus of acute infarction within the left cerebella
r vermis. There is cerebral volume loss and small vessel disease.

 

POS: OFF

## 2019-10-25 LAB
ALBUMIN SERPL BCG-MCNC: 2.4 G/DL (ref 3.4–4.8)
ALP SERPL-CCNC: 83 U/L (ref 40–110)
ALT SERPL W P-5'-P-CCNC: 26 U/L (ref 8–55)
ANION GAP SERPL CALC-SCNC: 11 MMOL/L (ref 10–20)
AST SERPL-CCNC: 27 U/L (ref 5–34)
BASOPHILS # BLD AUTO: 0 THOU/UL (ref 0–0.2)
BASOPHILS NFR BLD AUTO: 0.8 % (ref 0–1)
BILIRUB SERPL-MCNC: 1 MG/DL (ref 0.2–1.2)
BUN SERPL-MCNC: 23 MG/DL (ref 9.8–20.1)
CALCIUM SERPL-MCNC: 8 MG/DL (ref 7.8–10.44)
CHLORIDE SERPL-SCNC: 108 MMOL/L (ref 98–107)
CO2 SERPL-SCNC: 25 MMOL/L (ref 23–31)
CREAT CL PREDICTED SERPL C-G-VRATE: 70 ML/MIN (ref 70–130)
EOSINOPHIL # BLD AUTO: 0.2 THOU/UL (ref 0–0.7)
EOSINOPHIL NFR BLD AUTO: 3.4 % (ref 0–10)
GLOBULIN SER CALC-MCNC: 2.6 G/DL (ref 2.4–3.5)
GLUCOSE SERPL-MCNC: 98 MG/DL (ref 80–115)
HGB BLD-MCNC: 9.7 G/DL (ref 12–16)
LYMPHOCYTES # BLD: 1 THOU/UL (ref 1.2–3.4)
LYMPHOCYTES NFR BLD AUTO: 17.7 % (ref 21–51)
MAGNESIUM SERPL-MCNC: 2 MG/DL (ref 1.6–2.6)
MCH RBC QN AUTO: 30.9 PG (ref 27–31)
MCV RBC AUTO: 98.5 FL (ref 78–98)
MONOCYTES # BLD AUTO: 0.6 THOU/UL (ref 0.11–0.59)
MONOCYTES NFR BLD AUTO: 9.8 % (ref 0–10)
NEUTROPHILS # BLD AUTO: 3.9 THOU/UL (ref 1.4–6.5)
NEUTROPHILS NFR BLD AUTO: 68.3 % (ref 42–75)
PLATELET # BLD AUTO: 150 THOU/UL (ref 130–400)
POTASSIUM SERPL-SCNC: 4.1 MMOL/L (ref 3.5–5.1)
RBC # BLD AUTO: 3.14 MILL/UL (ref 4.2–5.4)
SODIUM SERPL-SCNC: 140 MMOL/L (ref 136–145)
WBC # BLD AUTO: 5.7 THOU/UL (ref 4.8–10.8)

## 2019-10-25 RX ADMIN — Medication SCH ML: at 21:16

## 2019-10-25 RX ADMIN — Medication SCH: at 00:26

## 2019-10-25 RX ADMIN — Medication SCH ML: at 11:20

## 2019-10-25 RX ADMIN — CIPROFLOXACIN HYDROCHLORIDE SCH MG: 250 TABLET, FILM COATED ORAL at 21:15

## 2019-10-25 NOTE — ULT
CAROTID ARTERIAL DOPPLER ULTRASOUND:



10/25/2019



HISTORY:

CVA. Evaluate for carotid arterial disease.



COMPARISON:

None.



TECHNIQUE:

Multiplanar gray-scale sonographic imaging of the arterial structures of the neck obtained with color
-flow and spectral analysis.



FINDINGS:

Antegrade blood flow noted within the carotid and vertebral system bilaterally.



VESSEL          PEAK SYSTOLIC VELOCITY (cm per second)

Right CCA       72

Right ICA        47

Right ECA       91

Left CCA         62

Left ICA          74

Left ECA         126



ICA/CCA ratio 0.7 on the right and 1.2 on the left.



IMPRESSION:

No hemodynamically significant stenosis on the basis of sonographic velocity criteria.



Transcribed Date/Time: 10/25/2019 9:31 AM



Reported By: Vasiliy Hawk 

Electronically Signed:  10/25/2019 10:55 AM

## 2019-10-25 NOTE — PDOC.HOSPP
- Subjective


Encounter Date: 10/25/19


Encounter Time: 12:03


Subjective: 


68 y/o female with HTN, DJD admitted with AMS of combativeness. Found to have 

severe sepsis due to UTI and started on IVF and antibiotics. Found to have 

elevated troponin thought to be demand ischemia but echo showed reduced EF of 45

-50, diastolic dysfunction, wall motion abnormalities and pulmonary HTN. 

Treated with antibiotic and others with improvement. MRI 0f 10/24/2019 showed 

acute CVA. Patient pulled off again NG tube last night. More awake and answered 

few questions.





- Objective


Vital Signs & Weight: 


 Vital Signs (12 hours)











  Temp Pulse Resp BP Pulse Ox


 


 10/25/19 07:59  97.5 F L  77  20  138/64  96


 


 10/25/19 04:00  97.7 F  75  20  146/67 H  95








 Weight











Admit Weight                   128 lb


 


Weight                         128 lb 11.999 oz











 Most Recent Monitor Data











Heart Rate from ECG            82


 


NIBP                           153/102


 


NIBP BP-Mean                   119


 


Respiration from ECG           25


 


SpO2                           94














I&O: 


 











 10/24/19 10/25/19 10/26/19





 06:59 06:59 06:59


 


Intake Total 1310 165 


 


Output Total 2625 900 


 


Balance -1315 -515 











Result Diagrams: 


 10/25/19 09:32





 10/25/19 09:32





Hospitalist ROS





- Medication


Medications: 


Active Medications











Generic Name Dose Route Start Last Admin





  Trade Name Freq  PRN Reason Stop Dose Admin


 


Acetaminophen  650 mg  10/23/19 22:28  10/24/19 22:19





  Tylenol  PO   650 mg





  Q4H PRN   Administration





  Fever > 101   





     





     





     


 


Albuterol/Ipratropium  3 ml  10/16/19 17:49  10/23/19 05:09





  Duoneb  NEB   3 ml





  Q6H PRN   Administration





  .WHEEZING   





     





     





     


 


Furosemide  20 mg  10/24/19 09:00  10/25/19 11:19





  Lasix  PO   Not Given





  DAILY GABRIELLE   





     





     





     





     


 


Cefepime HCl 1 gm/ Sodium  100 mls @ 200 mls/hr  10/16/19 09:00  10/25/19 11:04





  Chloride  IVPB   100 mls





  Q12HR GABRIELLE   Administration





     





     





     





     


 


Nicotine  21 mg  10/17/19 09:00  10/25/19 11:07





  Nicoderm Patch  TD   21 mg





  DAILY GABRIELLE   Administration





     





     





     





     


 


Potassium Chloride  10 meq  10/24/19 17:00  10/25/19 11:19





  Klor-Con  PER TUBE   10 meq





  BID-WM GABRIELLE   Administration





     





     





     





     


 


Sodium Chloride  10 ml  10/17/19 21:00  10/25/19 11:20





  Flush - Normal Saline  IVF   10 ml





  Q12HR GABRIELLE   Administration





     





     





     





     


 


Tramadol HCl  50 mg  10/23/19 22:28  10/25/19 03:17





  Ultram  PO   50 mg





  Q4H PRN   Administration





  Mild-Moderate Pain (1-5)   





     





     





     














- Exam


General Appearance: awake alert


Eye: anicteric sclera


ENT: normocephalic atraumatic


Neck: supple, no JVD


Heart: RRR


Respiratory: no wheezes, no ronchi, normal chest expansion, no tachypnea


Respiratory - other findings: fair air entry bilaterally with scattered crackles

/transmitted sound.


Gastrointestinal: soft, non-tender, non-distended, normal bowel sounds


Extremities: 1+ LE edema


Neurological: cranial nerve grossly intact


Neurological - other findings: moving all limbs


Psychiatric: oriented to person, oriented to place





Hosp A/P


(1) Acute cerebrovascular accident (CVA) of cerebellum


Code(s): I63.9 - CEREBRAL INFARCTION, UNSPECIFIED   Status: Acute   





(2) Acute metabolic encephalopathy


Code(s): G93.41 - METABOLIC ENCEPHALOPATHY   Status: Acute   





(3) Severe sepsis


Code(s): A41.9 - SEPSIS, UNSPECIFIED ORGANISM; R65.20 - SEVERE SEPSIS WITHOUT 

SEPTIC SHOCK   Status: Acute   





(4) Acute anemia


Code(s): D64.9 - ANEMIA, UNSPECIFIED   Status: Acute   





(5) Metabolic acidosis


Code(s): E87.2 - ACIDOSIS   Status: Acute   





(6) Rhabdomyolysis


Code(s): M62.82 - RHABDOMYOLYSIS   Status: Acute   





(7) Moderate protein-calorie malnutrition


Code(s): E44.0 - MODERATE PROTEIN-CALORIE MALNUTRITION   Status: Acute   





(8) Generalized edema


Code(s): R60.1 - GENERALIZED EDEMA   Status: Acute   





(9) Pulmonary hypertension


Code(s): I27.20 - PULMONARY HYPERTENSION, UNSPECIFIED   Status: Acute   





(10) Acute combined systolic and diastolic heart failure


Code(s): I50.41 - ACUTE COMBINED SYSTOLIC AND DIASTOLIC (CONGESTIVE) HRT FAIL   

Status: Acute   





(11) Tricuspid regurgitation


Code(s): I07.1 - RHEUMATIC TRICUSPID INSUFFICIENCY   Status: Acute   





(12) Cardiomyopathy


Code(s): I42.9 - CARDIOMYOPATHY, UNSPECIFIED   Status: Acute   





(13) Type 2 AMI (acute myocardial infarction)


Code(s): I21.A1 - MYOCARDIAL INFARCTION TYPE 2   Status: Acute   





(14) Physical deconditioning


Code(s): R53.81 - OTHER MALAISE   Status: Acute   





(15) UTI (urinary tract infection)


Status: Acute   





- Plan


Start dysphagic diet as recommended by Speech.


DC tube feeding as NG tube is off.


Get Carotid dopplers


Continue low dose diuretic


Substitute Cefepime with oral cipro


Continue other supportive care.


Bronchodilators as needed


PT/OT to continue.


Monitor H/H and electrolytes and address as needed


Pulm/critical care, cardiology and Neurology following.


Consult  for dispcharge planning.

## 2019-10-25 NOTE — PRG
DATE OF TELEMEDICINE FOLLOW UP SERVICE:  10/25/2019



CHIEF COMPLAINT:  Altered mental status.



INTERVAL HISTORY:  I am not seeing this patient for a few days and I checked on 
her

MRI and I noted she had abnormal MRI with a stroke.  At this time, the patient 
seems

to be still confused and encephalopathic, and her current workup with MRI of the

brain was completed yesterday and it showed motion-limited examination 
demonstrating

a 67 mm focus of acute infarct within the left cerebellar vermis, cerebral 
volume

loss, and vascular disease.  Her echocardiogram report is as noted earlier and 
echo

showed mild pulmonic regurgitation and EF of 45% to 50% with diastolic 
dysfunction.

A carotid Doppler study showed no evidence of any hemodynamically significant

stenosis. 



LABORATORY DATA:  Current lab workup; white count 5.7, hemoglobin 9.7, 
hematocrit

30.9, and platelet count 150.  Chemistry; sodium 140, potassium 4.1, chloride 
108,

bicarb 25, BUN 23, creatinine 0.7. 



PHYSICAL EXAMINATION:

VITAL SIGNS:  Temperature 98.7, pulse 80, respiratory rate 16, O2 sats 80, blood

pressure 115/75. 

GENERAL APPEARANCE:  The patient is today on her back and seems to be confused 
and

agitated and was uncooperative during testing. 

HEENT:  Pupils 2 mm. 

NEUROLOGIC:  Motor exam; she moves all extremities, but is agitated.



IMPRESSION:  The patient is a 69-year-old lady with unclear etiology of her 
altered

mental status.  She seems to have hypoxia intermittently and she still remains

combative.  She has had urinary tract infection and MRI currently shows an acute

cerebrovascular accident.  At this time, I do think she still has metabolic

encephalopathy and it is unclear how long she has been encephalopathic. 



RECOMMENDATIONS:  Please discuss with family and consider skilled nursing 
facility

or nursing home placement.  I am unable to diagnose her with anything other than

likely 

vascular dementia with superimposed exacerbation of encephalopathy.  Followup as

needed.  Please call me if you have further question. 







Job ID:  [id number]



MTDD

## 2019-10-26 LAB
ALBUMIN SERPL BCG-MCNC: 2.5 G/DL (ref 3.4–4.8)
ANION GAP SERPL CALC-SCNC: 14 MMOL/L (ref 10–20)
BASOPHILS # BLD AUTO: 0 THOU/UL (ref 0–0.2)
BASOPHILS NFR BLD AUTO: 0.4 % (ref 0–1)
BUN SERPL-MCNC: 21 MG/DL (ref 9.8–20.1)
BUN/CREAT SERPL: 29.58
CALCIUM SERPL-MCNC: 8.2 MG/DL (ref 7.8–10.44)
CHLORIDE SERPL-SCNC: 106 MMOL/L (ref 98–107)
CO2 SERPL-SCNC: 21 MMOL/L (ref 23–31)
CREAT CL PREDICTED SERPL C-G-VRATE: 69 ML/MIN (ref 70–130)
EOSINOPHIL # BLD AUTO: 0.1 THOU/UL (ref 0–0.7)
EOSINOPHIL NFR BLD AUTO: 2 % (ref 0–10)
GLUCOSE SERPL-MCNC: 89 MG/DL (ref 80–115)
HGB BLD-MCNC: 10.3 G/DL (ref 12–16)
LYMPHOCYTES # BLD: 0.8 THOU/UL (ref 1.2–3.4)
LYMPHOCYTES NFR BLD AUTO: 15.3 % (ref 21–51)
MCH RBC QN AUTO: 31.1 PG (ref 27–31)
MCV RBC AUTO: 97.4 FL (ref 78–98)
MONOCYTES # BLD AUTO: 0.5 THOU/UL (ref 0.11–0.59)
MONOCYTES NFR BLD AUTO: 10 % (ref 0–10)
NEUTROPHILS # BLD AUTO: 3.8 THOU/UL (ref 1.4–6.5)
NEUTROPHILS NFR BLD AUTO: 72.3 % (ref 42–75)
PLATELET # BLD AUTO: 155 THOU/UL (ref 130–400)
POTASSIUM SERPL-SCNC: 4.2 MMOL/L (ref 3.5–5.1)
RBC # BLD AUTO: 3.3 MILL/UL (ref 4.2–5.4)
SODIUM SERPL-SCNC: 137 MMOL/L (ref 136–145)
WBC # BLD AUTO: 5.3 THOU/UL (ref 4.8–10.8)

## 2019-10-26 RX ADMIN — CIPROFLOXACIN HYDROCHLORIDE SCH MG: 250 TABLET, FILM COATED ORAL at 20:50

## 2019-10-26 RX ADMIN — Medication SCH ML: at 09:14

## 2019-10-26 RX ADMIN — Medication SCH ML: at 20:51

## 2019-10-26 RX ADMIN — CIPROFLOXACIN HYDROCHLORIDE SCH MG: 250 TABLET, FILM COATED ORAL at 05:59

## 2019-10-26 NOTE — PDOC.HOSPP
- Subjective


Encounter Date: 10/26/19


Encounter Time: 09:24


Subjective: 


68 y/o female with HTN, DJD admitted with AMS of combativeness. Found to have 

severe sepsis due to UTI and started on IVF and antibiotics. Found to have 

elevated troponin thought to be demand ischemia but echo showed reduced EF of 45

-50, diastolic dysfunction, wall motion abnormalities and pulmonary HTN. 

Treated with antibiotic and others with improvement. MRI 0f 10/24/2019 showed 

acute CVA. More awake and answered few questions and obeying commands. 

tolerating dysphagic diet. Has pauses with longest being 3 seconds night





- Objective


Vital Signs & Weight: 


 Vital Signs (12 hours)











  Temp Pulse Resp BP BP Pulse Ox


 


 10/26/19 09:13   85   142/66 H  


 


 10/26/19 08:55       92 L


 


 10/26/19 08:00  97.7 F  85  16   142/66 H  92 L


 


 10/26/19 04:19  98.9 F  81  16   153/80 H  95


 


 10/26/19 00:02  99.7 F H  97  18   167/75 H  96








 Weight











Admit Weight                   128 lb


 


Weight                         128 lb 11.999 oz











 Most Recent Monitor Data











Heart Rate from ECG            82


 


NIBP                           153/102


 


NIBP BP-Mean                   119


 


Respiration from ECG           25


 


SpO2                           94














I&O: 


 











 10/25/19 10/26/19 10/27/19





 06:59 06:59 06:59


 


Intake Total 165  


 


Output Total 900 1100 


 


Balance -735 -1100 











Result Diagrams: 


 10/26/19 03:23





 10/26/19 03:23





Hospitalist ROS





- Medication


Medications: 


Active Medications











Generic Name Dose Route Start Last Admin





  Trade Name Julioq  PRN Reason Stop Dose Admin


 


Acetaminophen  650 mg  10/23/19 22:28  10/24/19 22:19





  Tylenol  PO   650 mg





  Q4H PRN   Administration





  Fever > 101   





     





     





     


 


Albuterol/Ipratropium  3 ml  10/16/19 17:49  10/23/19 05:09





  Duoneb  NEB   3 ml





  Q6H PRN   Administration





  .WHEEZING   





     





     





     


 


Amlodipine Besylate  2.5 mg  10/26/19 09:00  10/26/19 09:13





  Norvasc  PO   2.5 mg





  DAILY G[facility]ELLE   Administration





     





     





     





     


 


Aspirin  325 mg  10/25/19 09:00  10/26/19 09:13





  Ecotrin  PO   325 mg





  DAILY GABRIELLE   Administration





     





     





     





     


 


Atorvastatin Calcium  40 mg  10/25/19 21:00  10/25/19 21:16





  Lipitor  PO   40 mg





  HS G[facility]ELLE   Administration





     





     





     





     


 


Ciprofloxacin  500 mg  10/25/19 20:00  10/26/19 05:59





  Cipro  PO   500 mg





  0600,2000 [facility]   Administration





     





     





     





     


 


Furosemide  20 mg  10/24/19 09:00  10/26/19 09:13





  Lasix  PO   20 mg





  DAILY GABRIELLE   Administration





     





     





     





     


 


Nicotine  21 mg  10/17/19 09:00  10/26/19 09:13





  Nicoderm Patch  TD   21 mg





  DAILY GABRIELLE   Administration





     





     





     





     


 


Potassium Chloride  10 meq  10/24/19 17:00  10/26/19 09:13





  Klor-Con  PER TUBE   10 meq





  BID-WM [facility]   Administration





     





     





     





     


 


Sodium Chloride  10 ml  10/17/19 21:00  10/26/19 09:14





  Flush - Normal Saline  IVF   10 ml





  Q12HR [facility]   Administration





     





     





     





     


 


Tramadol HCl  50 mg  10/23/19 22:28  10/25/19 03:17





  Ultram  PO   50 mg





  Q4H PRN   Administration





  Mild-Moderate Pain (1-5)   





     





     





     














- Exam


General Appearance: awake alert


Eye: anicteric sclera


ENT: normocephalic atraumatic


Neck: no JVD


Heart: RRR


Respiratory: no wheezes, no ronchi


Respiratory - other findings: fair air entry with some transmitted sound


Gastrointestinal: soft, non-tender, non-distended, normal bowel sounds


Extremities - other findings: mild edema of left elbow


Neurological: cranial nerve grossly intact


Neurological - other findings: mild weakness of left upper limb with power 3/5. 

Power 4-/5 all other limbs


Psychiatric: oriented to person, oriented to place





Hosp A/P


(1) Acute cerebrovascular accident (CVA) of cerebellum


Code(s): I63.9 - CEREBRAL INFARCTION, UNSPECIFIED   Status: Acute   





(2) Acute metabolic encephalopathy


Code(s): G93.41 - METABOLIC ENCEPHALOPATHY   Status: Acute   





(3) Severe sepsis


Code(s): A41.9 - SEPSIS, UNSPECIFIED ORGANISM; R65.20 - SEVERE SEPSIS WITHOUT 

SEPTIC SHOCK   Status: Acute   





(4) Acute anemia


Code(s): D64.9 - ANEMIA, UNSPECIFIED   Status: Acute   





(5) Metabolic acidosis


Code(s): E87.2 - ACIDOSIS   Status: Acute   





(6) Rhabdomyolysis


Code(s): M62.82 - RHABDOMYOLYSIS   Status: Acute   





(7) Moderate protein-calorie malnutrition


Code(s): E44.0 - MODERATE PROTEIN-CALORIE MALNUTRITION   Status: Acute   





(8) Generalized edema


Code(s): R60.1 - GENERALIZED EDEMA   Status: Acute   





(9) Pulmonary hypertension


Code(s): I27.20 - PULMONARY HYPERTENSION, UNSPECIFIED   Status: Acute   





(10) Acute combined systolic and diastolic heart failure


Code(s): I50.41 - ACUTE COMBINED SYSTOLIC AND DIASTOLIC (CONGESTIVE) HRT FAIL   

Status: Acute   





(11) Tricuspid regurgitation


Code(s): I07.1 - RHEUMATIC TRICUSPID INSUFFICIENCY   Status: Acute   





(12) Cardiomyopathy


Code(s): I42.9 - CARDIOMYOPATHY, UNSPECIFIED   Status: Acute   





(13) Type 2 AMI (acute myocardial infarction)


Code(s): I21.A1 - MYOCARDIAL INFARCTION TYPE 2   Status: Acute   





(14) Physical deconditioning


Code(s): R53.81 - OTHER MALAISE   Status: Acute   





(15) UTI (urinary tract infection)


Status: Acute   





- Plan


Start low dose lisinopril and titrate upwards as tolerated.


DC stout cath and restraints


Continue dysphagic diet


Continue low dose diuretic


Continue oral cipro


Continue other supportive care.


Bronchodilators as needed


PT/OT to continue.


Monitor H/H and electrolytes and address as needed


Pulm/critical care, cardiology and Neurology following.


Consult rehab screening.

## 2019-10-26 NOTE — PDOC.CPN
- Subjective


Date: 10/26/19


Time: 12:16


Interval history: 





The pt seen and examined.  No overnight events.  No cardiac complaints.  The pt 

is confused





- Objective


Allergies/Adverse Reactions: 


 Allergies











Allergy/AdvReac Type Severity Reaction Status Date / Time


 


diphenhydramine Allergy   Verified 04/28/18 23:35





[From Benadl]     











Visit Medications: 


 Current Medications





Acetaminophen (Tylenol)  650 mg PO Q4H PRN


   PRN Reason: Fever > 101


   Last Admin: 10/24/19 22:19 Dose:  650 mg


Albuterol/Ipratropium (Duoneb)  3 ml NEB Q6H PRN


   PRN Reason: .WHEEZING


   Last Admin: 10/23/19 05:09 Dose:  3 ml


Lipase/Protease/Amylase ([place] [id number])  1 cap FS .PER PROTOCOL PRN


   PRN Reason: TUBE OCCLUSION PROTOCOL


Aspirin (Ecotrin)  325 mg PO DAILY [facility]


   Last Admin: 10/26/19 09:13 Dose:  325 mg


Atorvastatin Calcium (Lipitor)  40 mg PO HS [facility]


   Last Admin: 10/25/19 21:16 Dose:  40 mg


Ciprofloxacin (Cipro)  500 mg PO 0600,2000 [facility]


   Last Admin: 10/26/19 05:59 Dose:  500 mg


Furosemide (Lasix)  20 mg PO DAILY [facility]


   Last Admin: 10/26/19 09:13 Dose:  20 mg


Lisinopril (Zestril)  2.5 mg PO DAILY [facility]


Nicotine (Nicoderm Patch)  21 mg TD DAILY [facility]


   Last Admin: 10/26/19 09:13 Dose:  21 mg


Potassium Chloride (Klor-Con)  10 meq PER TUBE BID-WM [facility]


   Last Admin: 10/26/19 09:13 Dose:  10 meq


Sodium Chloride (Flush - Normal Saline)  10 ml IVF Q12HR [facility]


   Last Admin: 10/26/19 09:14 Dose:  10 ml


Sodium Chloride (Flush - Normal Saline)  10 ml IVF PRN PRN


   PRN Reason: Saline Flush


Tramadol HCl (Ultram)  50 mg PO Q4H PRN


   PRN Reason: Mild-Moderate Pain (1-5)


   Last Admin: 10/25/19 03:17 Dose:  50 mg








Vital Signs & Weight: 


 Vital Signs











  Temp Pulse Resp BP BP Pulse Ox


 


 10/26/19 11:50  97.6 F  99  18   137/68  96


 


 10/26/19 09:13   85   142/66 H  


 


 10/26/19 08:55       92 L


 


 10/26/19 08:00  97.7 F  85  16   142/66 H  92 L


 


 10/26/19 04:19  98.9 F  81  16   153/80 H  95








 











Admit Weight                   128 lb


 


Weight                         128 lb 11.999 oz

















- Physical Exam


General: other (oriented to self only)


Neck: supple neck


Cardiac: regular rate and rhythm, S1/S2


Lungs: clear to auscultation





- Labs


Result Diagrams: 


 10/26/19 03:23





 10/26/19 03:23


 Troponin/CKMB











CK-MB (CK-2)  9.7 ng/mL (0-6.6)  H*  10/15/19  17:51    


 


Troponin I  1.302 ng/mL (< 0.028)  H*  10/15/19  21:30    














- Telemetry


Sinus rhythms and dysrhythmias: sinus rhythm





- Assessment/Plan


Assessment/Plan: 





1. S/p 3 sec pauses on 10/25/2019 - Per RN, the pt was asymptomatic during the 

episode; Will cont to monitor on tele and possible D/c home/rehab with heart 

monitor.


2. Type 2 demand ischemia - Most likely secondary issues at this point and 

would continue with conservative therapy for now. Not a candidate for any 

invasive interventions art this time. 


3. Urosepsis shock - on ABX; managed by PCP


4. Anemia - stable


5. Akinetic apex - most likely a takotsubo variant but may be related to 

underlying CAD, EF at 45-50%.


6. Current smoker - smoking cessation education given to the pt


MAR reviewed





* Echo on 10/21/2019 with EF 45-50%, grade I dd, mild MR and [place], mild-mod AR, 

severe TR, akinesis apical, and RVSP 45 mmHg


* Dr Yanes's pt.

## 2019-10-27 LAB
BASOPHILS # BLD AUTO: 0 THOU/UL (ref 0–0.2)
BASOPHILS NFR BLD AUTO: 0.8 % (ref 0–1)
EOSINOPHIL # BLD AUTO: 0.1 THOU/UL (ref 0–0.7)
EOSINOPHIL NFR BLD AUTO: 2 % (ref 0–10)
HGB BLD-MCNC: 9.4 G/DL (ref 12–16)
LYMPHOCYTES # BLD: 1.1 THOU/UL (ref 1.2–3.4)
LYMPHOCYTES NFR BLD AUTO: 20.9 % (ref 21–51)
MCH RBC QN AUTO: 30.7 PG (ref 27–31)
MCV RBC AUTO: 96.5 FL (ref 78–98)
MONOCYTES # BLD AUTO: 0.6 THOU/UL (ref 0.11–0.59)
MONOCYTES NFR BLD AUTO: 11.2 % (ref 0–10)
NEUTROPHILS # BLD AUTO: 3.5 THOU/UL (ref 1.4–6.5)
NEUTROPHILS NFR BLD AUTO: 65.1 % (ref 42–75)
PLATELET # BLD AUTO: 186 THOU/UL (ref 130–400)
RBC # BLD AUTO: 3.07 MILL/UL (ref 4.2–5.4)
WBC # BLD AUTO: 5.4 THOU/UL (ref 4.8–10.8)

## 2019-10-27 RX ADMIN — CIPROFLOXACIN HYDROCHLORIDE SCH MG: 250 TABLET, FILM COATED ORAL at 06:06

## 2019-10-27 RX ADMIN — Medication SCH ML: at 08:33

## 2019-10-27 RX ADMIN — Medication SCH ML: at 20:12

## 2019-10-27 RX ADMIN — CIPROFLOXACIN HYDROCHLORIDE SCH MG: 250 TABLET, FILM COATED ORAL at 20:12

## 2019-10-27 NOTE — PDOC.CPN
- Subjective


Date: 10/27/19


Time: 09:43


Interval history: 





The pt seen and examined.  No overnight events.  No cardiac complaints.  She 

cont intermittent confusion.  





- Objective


Allergies/Adverse Reactions: 


 Allergies











Allergy/AdvReac Type Severity Reaction Status Date / Time


 


diphenhydramine Allergy   Verified 04/28/18 23:35





[From Benadl]     











Visit Medications: 


 Current Medications





Acetaminophen (Tylenol)  650 mg PO Q4H PRN


   PRN Reason: Fever > 101


   Last Admin: 10/26/19 15:31 Dose:  650 mg


Albuterol/Ipratropium (Duoneb)  3 ml NEB Q6H PRN


   PRN Reason: .WHEEZING


   Last Admin: 10/23/19 05:09 Dose:  3 ml


Lipase/Protease/Amylase ([place] Dr [id number])  1 cap FS .PER PROTOCOL PRN


   PRN Reason: TUBE OCCLUSION PROTOCOL


Aspirin (Ecotrin)  325 mg PO DAILY [facility]


   Last Admin: 10/27/19 08:31 Dose:  325 mg


Atorvastatin Calcium (Lipitor)  40 mg PO HS [facility]


   Last Admin: 10/26/19 20:50 Dose:  40 mg


Ciprofloxacin (Cipro)  500 mg PO 0600,2000 [facility]


   Last Admin: 10/27/19 06:06 Dose:  500 mg


Furosemide (Lasix)  20 mg PO DAILY [facility]


   Last Admin: 10/27/19 08:32 Dose:  20 mg


Lisinopril (Zestril)  2.5 mg PO DAILY [facility]


   Last Admin: 10/27/19 08:31 Dose:  2.5 mg


Nicotine (Nicoderm Patch)  21 mg TD DAILY [facility]


   Last Admin: 10/27/19 08:31 Dose:  21 mg


Potassium Chloride (Klor-Con)  10 meq PER TUBE BID-WM [facility]


   Last Admin: 10/27/19 08:41 Dose:  10 meq


Sodium Chloride (Flush - Normal Saline)  10 ml IVF Q12HR [facility]


   Last Admin: 10/27/19 08:33 Dose:  10 ml


Sodium Chloride (Flush - Normal Saline)  10 ml IVF PRN PRN


   PRN Reason: Saline Flush


Tramadol HCl (Ultram)  50 mg PO Q4H PRN


   PRN Reason: Mild-Moderate Pain (1-5)


   Last Admin: 10/25/19 03:17 Dose:  50 mg








Vital Signs & Weight: 


 Vital Signs











  Temp Pulse Resp BP BP Pulse Ox


 


 10/27/19 08:31   89   156/67 H  


 


 10/27/19 08:00  97.4 F L  82  16   156/67 H  94 L


 


 10/27/19 03:52  98.3 F  89  18   136/63  92 L


 


 10/26/19 23:38  98.3 F  95  16   130/63  95








 











Admit Weight                   128 lb


 


Weight                         128 lb 11.999 oz

















- Physical Exam


General: other (alert and oriented to self and place; but not situation)


Neck: supple neck


Cardiac: regular rate and rhythm, S1/S2


Lungs: decreased breath sounds


Extremities: no cyanosis


Skin: clear


Musculoskeletal: decreased range of motion





- Labs


Result Diagrams: 


 10/27/19 05:32





 10/26/19 03:23


 Troponin/CKMB











CK-MB (CK-2)  9.7 ng/mL (0-6.6)  H*  10/15/19  17:51    


 


Troponin I  1.302 ng/mL (< 0.028)  H*  10/15/19  21:30    














- Telemetry


Sinus rhythms and dysrhythmias: sinus rhythm (HR was down to 40s for short 

period of time around 1900 last night.)





- Assessment/Plan


Assessment/Plan: 





1. S/p 3 sec pauses on 10/25/2019 - Per RN, the pt was asymptomatic during the 

episode; Will cont to monitor on tele and possible D/c home/rehab with heart 

monitor.


2. Type 2 demand ischemia - Most likely secondary issues at this point and 

would continue with conservative therapy for now. Not a candidate for any 

invasive interventions art this time. 


3. Urosepsis shock - on ABX; managed by PCP


4. Anemia - slightly down today


5. Akinetic apex - most likely a takotsubo variant but may be related to 

underlying CAD, EF at 45-50%.


6. Current smoker - smoking cessation education given to the pt


MAR reviewed





* Echo on 10/21/2019 with EF 45-50%, grade I dd, mild MR and [place], mild-mod AR, 

severe TR, akinesis apical, and RVSP 45 mmHg


* Dr Yanes's pt.

## 2019-10-27 NOTE — PDOC.HOSPP
- Subjective


Encounter Date: 10/27/19


Encounter Time: 07:14


Subjective: 


70 y/o female with HTN, DJD admitted with AMS of combativeness. Found to have 

severe sepsis due to UTI and started on IVF and antibiotics. Found to have 

elevated troponin thought to be demand ischemia but echo showed reduced EF of 45

-50, diastolic dysfunction, wall motion abnormalities and pulmonary HTN. 

Treated with antibiotic and others with improvement. MRI 0f 10/24/2019 showed 

acute CVA. More appropriate and conversational. complaining of generalized body 

aches and pain. Tolerating dysphagic diet. 





- Objective


Vital Signs & Weight: 


 Vital Signs (12 hours)











  Temp Pulse Resp BP Pulse Ox


 


 10/27/19 03:52  98.3 F  89  18  136/63  92 L


 


 10/26/19 23:38  98.3 F  95  16  130/63  95


 


 10/26/19 19:43  98.1 F  99  18  167/79 H  96








 Weight











Admit Weight                   128 lb


 


Weight                         128 lb 11.999 oz











 Most Recent Monitor Data











Heart Rate from ECG            82


 


NIBP                           153/102


 


NIBP BP-Mean                   119


 


Respiration from ECG           25


 


SpO2                           94














I&O: 


 











 10/26/19 10/27/19 10/28/19





 06:59 06:59 06:59


 


Intake Total  470 


 


Output Total 1100 950 


 


Balance -1100 -480 











Result Diagrams: 


 10/27/19 05:32





 10/26/19 03:23





Hospitalist ROS





- Medication


Medications: 


Active Medications











Generic Name Dose Route Start Last Admin





  Trade Name Freq  PRN Reason Stop Dose Admin


 


Acetaminophen  650 mg  10/23/19 22:28  10/26/19 15:31





  Tylenol  PO   650 mg





  Q4H PRN   Administration





  Fever > 101   





     





     





     


 


Albuterol/Ipratropium  3 ml  10/16/19 17:49  10/23/19 05:09





  Duoneb  NEB   3 ml





  Q6H PRN   Administration





  .WHEEZING   





     





     





     


 


Aspirin  325 mg  10/25/19 09:00  10/26/19 09:13





  Ecotrin  PO   325 mg





  DAILY GABRIELLE   Administration





     





     





     





     


 


Atorvastatin Calcium  40 mg  10/25/19 21:00  10/26/19 20:50





  Lipitor  PO   40 mg





  HS [facility]   Administration





     





     





     





     


 


Ciprofloxacin  500 mg  10/25/19 20:00  10/27/19 06:06





  Cipro  PO   500 mg





  0600,2000 [facility]   Administration





     





     





     





     


 


Furosemide  20 mg  10/24/19 09:00  10/26/19 09:13





  Lasix  PO   20 mg





  DAILY [facility]LE   Administration





     





     





     





     


 


Nicotine  21 mg  10/17/19 09:00  10/26/19 09:13





  Nicoderm Patch  TD   21 mg





  DAILY GABRIELLE   Administration





     





     





     





     


 


Potassium Chloride  10 meq  10/24/19 17:00  10/26/19 17:46





  Klor-Con  PER TUBE   10 meq





  BID-WM [facility]   Administration





     





     





     





     


 


Sodium Chloride  10 ml  10/17/19 21:00  10/26/19 20:51





  Flush - Normal Saline  IVF   10 ml





  Q12HR [facility]   Administration





     





     





     





     


 


Tramadol HCl  50 mg  10/23/19 22:28  10/25/19 03:17





  Ultram  PO   50 mg





  Q4H PRN   Administration





  Mild-Moderate Pain (1-5)   





     





     





     














- Exam


General Appearance: awake alert


Eye: anicteric sclera


ENT: normocephalic atraumatic, moist mucosa


Neck: supple, no JVD


Heart: RRR


Respiratory: no wheezes, no ronchi, normal chest expansion, no tachypnea


Respiratory - other findings: fair air entry with few transmitted sound. 


Gastrointestinal: soft, non-tender, non-distended, normal bowel sounds


Extremities - other findings: mild to moderate bilateral leg edema


Neurological: cranial nerve grossly intact


Neurological - other findings: moving all limbs. power of LUE is decreased 

relative to others.


Musculoskeletal: generalized weakness, diffuse muscle atrophy


Psychiatric: A&O x 3


Psychiatric - other findings: memory lapses noted





Hosp A/P


(1) Acute cerebrovascular accident (CVA) of cerebellum


Code(s): I63.9 - CEREBRAL INFARCTION, UNSPECIFIED   Status: Acute   





(2) Acute metabolic encephalopathy


Code(s): G93.41 - METABOLIC ENCEPHALOPATHY   Status: Acute   





(3) Severe sepsis


Code(s): A41.9 - SEPSIS, UNSPECIFIED ORGANISM; R65.20 - SEVERE SEPSIS WITHOUT 

SEPTIC SHOCK   Status: Acute   





(4) Acute anemia


Code(s): D64.9 - ANEMIA, UNSPECIFIED   Status: Acute   





(5) Metabolic acidosis


Code(s): E87.2 - ACIDOSIS   Status: Acute   





(6) Rhabdomyolysis


Code(s): M62.82 - RHABDOMYOLYSIS   Status: Acute   





(7) Moderate protein-calorie malnutrition


Code(s): E44.0 - MODERATE PROTEIN-CALORIE MALNUTRITION   Status: Acute   





(8) Generalized edema


Code(s): R60.1 - GENERALIZED EDEMA   Status: Acute   





(9) Pulmonary hypertension


Code(s): I27.20 - PULMONARY HYPERTENSION, UNSPECIFIED   Status: Acute   





(10) Acute combined systolic and diastolic heart failure


Code(s): I50.41 - ACUTE COMBINED SYSTOLIC AND DIASTOLIC (CONGESTIVE) HRT FAIL   

Status: Acute   





(11) Tricuspid regurgitation


Code(s): I07.1 - RHEUMATIC TRICUSPID INSUFFICIENCY   Status: Acute   





(12) Cardiomyopathy


Code(s): I42.9 - CARDIOMYOPATHY, UNSPECIFIED   Status: Acute   





(13) Type 2 AMI (acute myocardial infarction)


Code(s): I21.A1 - MYOCARDIAL INFARCTION TYPE 2   Status: Acute   





(14) Physical deconditioning


Code(s): R53.81 - OTHER MALAISE   Status: Acute   





(15) UTI (urinary tract infection)


Status: Acute   





(16) Bradycardia


Code(s): R00.1 - BRADYCARDIA, UNSPECIFIED   Status: Acute   





(17) Sinus pause


Code(s): I45.5 - OTHER SPECIFIED HEART BLOCK   Status: Acute   





- Plan


Continue lisinopril and titrate upwards as tolerated.


Continue lasix and potassium supplementation


DC Cipro tomorrow to complete 14 days of antibiotics


Continue dysphagic diet


Continue other supportive care.


PT/OT to continue.


Discharge planning in progress: SNF vs rehab


Cardiology re evaluation possibly tomorrow Re: hypokinesis of myocardium and 

sinus pause

## 2019-10-28 LAB
BASOPHILS # BLD AUTO: 0.1 THOU/UL (ref 0–0.2)
BASOPHILS NFR BLD AUTO: 1.1 % (ref 0–1)
EOSINOPHIL # BLD AUTO: 0.1 THOU/UL (ref 0–0.7)
EOSINOPHIL NFR BLD AUTO: 2.2 % (ref 0–10)
HGB BLD-MCNC: 9.2 G/DL (ref 12–16)
LYMPHOCYTES # BLD: 1.1 THOU/UL (ref 1.2–3.4)
LYMPHOCYTES NFR BLD AUTO: 21.9 % (ref 21–51)
MCH RBC QN AUTO: 30.7 PG (ref 27–31)
MCV RBC AUTO: 96.3 FL (ref 78–98)
MONOCYTES # BLD AUTO: 0.6 THOU/UL (ref 0.11–0.59)
MONOCYTES NFR BLD AUTO: 11.2 % (ref 0–10)
NEUTROPHILS # BLD AUTO: 3.1 THOU/UL (ref 1.4–6.5)
NEUTROPHILS NFR BLD AUTO: 63.6 % (ref 42–75)
PLATELET # BLD AUTO: 205 THOU/UL (ref 130–400)
RBC # BLD AUTO: 2.98 MILL/UL (ref 4.2–5.4)
WBC # BLD AUTO: 4.9 THOU/UL (ref 4.8–10.8)

## 2019-10-28 RX ADMIN — CIPROFLOXACIN HYDROCHLORIDE SCH MG: 250 TABLET, FILM COATED ORAL at 06:05

## 2019-10-28 RX ADMIN — Medication SCH ML: at 21:33

## 2019-10-28 RX ADMIN — Medication SCH: at 09:44

## 2019-10-28 RX ADMIN — CIPROFLOXACIN HYDROCHLORIDE SCH MG: 250 TABLET, FILM COATED ORAL at 21:32

## 2019-10-28 NOTE — PDOC.CPN
- Subjective


Date: 10/28/19


Time: 10:56


Interval history: 





She is doing much better. Her mentation has significantly improved is oriented 

x 3. 





- Review of Systems


General: denies: fever/chills, weight/appetite/sleep changes, night sweats, 

fatigue


Respiratory: denies: cough, congestion, shortness of breath, exercise 

intolerance


Cardiovascular: denies: chest pain, palpitation, edema, paroxysmal nocturnal 

dyspnea, orthopnea


Gastrointestinal: denies: nausea, vomiting, diarrhea, constipation, abd pain, 

GI bleeding


Musculoskeletal: denies: pain, tenderness, stiffness, swelling, arthritis/

arthralgias


Neurological: reports: weakness.  denies: numbness, syncope, seizure





- Objective


Allergies/Adverse Reactions: 


 Allergies











Allergy/AdvReac Type Severity Reaction Status Date / Time


 


diphenhydramine Allergy   Verified 04/28/18 23:35





[From Benadryl]     











Visit Medications: 


 Current Medications





Acetaminophen (Tylenol)  650 mg PO Q4H PRN


   PRN Reason: Fever > 101


   Last Admin: 10/26/19 15:31 Dose:  650 mg


Albuterol/Ipratropium (Duoneb)  3 ml NEB Q6H PRN


   PRN Reason: .WHEEZING


   Last Admin: 10/23/19 05:09 Dose:  3 ml


Lipase/Protease/Amylase ([place] Dr [id number])  1 cap FS .PER PROTOCOL PRN


   PRN Reason: TUBE OCCLUSION PROTOCOL


Aspirin (Ecotrin)  325 mg PO [facility]ILY [facility]


   Last Admin: 10/28/19 09:42 Dose:  325 mg


Atorvastatin Calcium (Lipitor)  40 mg PO HS [facility]


   Last Admin: 10/27/19 20:12 Dose:  40 mg


Ciprofloxacin (Cipro)  500 mg PO 0600,2000 [facility]


   Last Admin: 10/28/19 06:05 Dose:  500 mg


Furosemide (Lasix)  20 mg PO DAILY [facility]


   Last Admin: 10/28/19 09:43 Dose:  20 mg


Sodium Chloride (Normal Saline 0.9%)  1,000 mls @ 75 mls/hr IV .[id number] [facility]


   Last Admin: 10/28/19 06:05 Dose:  1,000 mls


Lisinopril (Zestril)  2.5 mg PO DAILY [facility]


   Last Admin: 10/28/19 09:43 Dose:  2.5 mg


Nicotine (Nicoderm Patch)  21 mg TD DAILY [facility]


   Last Admin: 10/28/19 09:44 Dose:  21 mg


Potassium Chloride (Klor-Con)  10 meq PER TUBE BID-WM [facility]


   Last Admin: 10/28/19 09:41 Dose:  10 meq


Sodium Chloride (Flush - Normal Saline)  10 ml IVF Q12HR [facility]


   Last Admin: 10/28/19 09:44 Dose:  Not Given


Sodium Chloride (Flush - Normal Saline)  10 ml IVF PRN PRN


   PRN Reason: Saline Flush


Tramadol HCl (Ultram)  100 mg PO Q6H PRN


   PRN Reason: Mild-Moderate Pain (1-5)


   Last Admin: 10/27/19 17:54 Dose:  100 mg








Vital Signs & Weight: 


 Vital Signs











  Temp Pulse Resp BP BP Pulse Ox


 


 10/28/19 09:43   87   149/67 H  


 


 10/28/19 08:20  97.4 F L  87  18   149/67 H  96


 


 10/28/19 03:48  97.9 F  75  18   147/65 H  95


 


 10/27/19 23:40  97.8 F  104 H  16   153/74 H  96








 











Admit Weight                   128 lb


 


Weight                         128 lb 11.999 oz

















- Physical Exam


General: alert & oriented x3, no apparent distress


HEENT: normocephaly


Neck: supple neck, midline trachea


Cardiac: regular rate and rhythm, no murmur


Lungs: clear to auscultation


Neuro: grossly intact


Abdomen: active bowel sounds, soft, non-tender


Extremities: no cyanosis, no clubbing, no edema


Skin: clear


Musculoskeletal: no pain





- Labs


Result Diagrams: 


 10/28/19 04:43





 10/26/19 03:23


 Troponin/CKMB











CK-MB (CK-2)  9.7 ng/mL (0-6.6)  H*  10/15/19  17:51    


 


Troponin I  1.302 ng/mL (< 0.028)  H*  10/15/19  21:30    














- Telemetry


Sinus rhythms and dysrhythmias: sinus rhythm





- Assessment/Plan


Assessment/Plan: 





1. AMS, resolved.


2. Type 2 demand ischemia. 


3. Septic shock


4. UTI sepsis. 


5. Anemia


6. Mild LV dysfunction with apical akinesis. 


7. Acute CVA








PLAN:


- Elevated troponins likelyt demand ischemia. 


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 

substrate for a thrombus. Will repeat echo and if apex still akinetic she will 

need a LHC as an outpatient and full anticoagulation for possible thrombus 

formation. If apex is moving then no indication for anticoagulation for now.

## 2019-10-28 NOTE — [email or web address].FMACP
Advance Care Planning





- Problem


(1) Acute cerebrovascular accident (CVA) of cerebellum


Status: Acute   Code(s): I63.9 - CEREBRAL INFARCTION, UNSPECIFIED   





(2) Acute combined systolic and diastolic heart failure


Status: Acute   Code(s): I50.41 - ACUTE COMBINED SYSTOLIC AND DIASTOLIC (

CONGESTIVE) HRT FAIL   





- Note


Participants: patient ([place] 2 d echo cardiogram to check if there is throbus 

to determin the bneed for long term anticoagulation)


Summary: 


Advanced Care Planning was discussed.  The diagnosis, prognosis and goals of 

care were discussed.  Appropriate forms and documentation to accomplish the 

goals of care were discussed.  All questions were answered.  The Palliative 

Care Team will be engaged to assist with completion of any outstanding forms 

that are needed.

## 2019-10-29 LAB
BASOPHILS # BLD AUTO: 0 THOU/UL (ref 0–0.2)
BASOPHILS NFR BLD AUTO: 0.4 % (ref 0–1)
EOSINOPHIL # BLD AUTO: 0.1 THOU/UL (ref 0–0.7)
EOSINOPHIL NFR BLD AUTO: 2.4 % (ref 0–10)
HGB BLD-MCNC: 10 G/DL (ref 12–16)
LYMPHOCYTES # BLD: 0.9 THOU/UL (ref 1.2–3.4)
LYMPHOCYTES NFR BLD AUTO: 17.1 % (ref 21–51)
MCH RBC QN AUTO: 31.6 PG (ref 27–31)
MCV RBC AUTO: 95.9 FL (ref 78–98)
MONOCYTES # BLD AUTO: 0.5 THOU/UL (ref 0.11–0.59)
MONOCYTES NFR BLD AUTO: 9.4 % (ref 0–10)
NEUTROPHILS # BLD AUTO: 3.6 THOU/UL (ref 1.4–6.5)
NEUTROPHILS NFR BLD AUTO: 70.7 % (ref 42–75)
PLATELET # BLD AUTO: 236 THOU/UL (ref 130–400)
RBC # BLD AUTO: 3.16 MILL/UL (ref 4.2–5.4)
WBC # BLD AUTO: 5.1 THOU/UL (ref 4.8–10.8)

## 2019-10-29 RX ADMIN — CIPROFLOXACIN HYDROCHLORIDE SCH: 250 TABLET, FILM COATED ORAL at 06:49

## 2019-10-29 RX ADMIN — Medication SCH ML: at 09:32

## 2019-10-29 RX ADMIN — CIPROFLOXACIN HYDROCHLORIDE SCH MG: 250 TABLET, FILM COATED ORAL at 21:16

## 2019-10-29 RX ADMIN — Medication SCH ML: at 21:16

## 2019-10-29 NOTE — PDOC.HOSPP
- Subjective


Encounter Date: 10/29/19


Encounter Time: 11:06


Subjective: 





no new complaints.





- Objective


Vital Signs & Weight: 


 Vital Signs (12 hours)











  Temp Pulse Resp BP BP Pulse Ox


 


 10/29/19 09:31   89   155/72 H  


 


 10/29/19 08:37  97.8 F  89  16   111/49 L  97


 


 10/29/19 08:00       97


 


 10/29/19 04:29  98.6 F  93  18   161/89 H  97


 


 10/28/19 23:52  98.4 F  85  16   136/63  95








 Weight











Admit Weight                   128 lb


 


Weight                         128 lb 11.999 oz











 Most Recent Monitor Data











Heart Rate from ECG            82


 


NIBP                           153/102


 


NIBP BP-Mean                   119


 


Respiration from ECG           25


 


SpO2                           94














I&O: 


 











 10/28/19 10/29/19 10/30/19





 06:59 06:59 06:59


 


Intake Total 1345 765 


 


Balance 1345 765 











Result Diagrams: 


 10/29/19 03:30





 10/26/19 03:23





Hospitalist ROS





- Medication


Medications: 


Active Medications











Generic Name Dose Route Start Last Admin





  Trade Name Freq  PRN Reason Stop Dose Admin


 


Acetaminophen  650 mg  10/23/19 22:28  10/26/19 15:31





  Tylenol  PO   650 mg





  Q4H PRN   Administration





  Fever > 101   





     





     





     


 


Albuterol/Ipratropium  3 ml  10/16/19 17:49  10/23/19 05:09





  Duoneb  NEB   3 ml





  Q6H PRN   Administration





  .WHEEZING   





     





     





     


 


Aspirin  325 mg  10/25/19 09:00  10/29/19 09:31





  Ecotrin  PO   Not Given





  [facility]   





     





     





     





     


 


Atorvastatin Calcium  40 mg  10/25/19 21:00  10/28/19 21:32





  Lipitor  PO   40 mg





  HS [facility]   Administration





     





     





     





     


 


Ciprofloxacin  500 mg  10/25/19 20:00  10/29/19 06:49





  Cipro  PO   Not Given





  0600,2000 G[facility]ELLE   





     





     





     





     


 


Furosemide  20 mg  10/24/19 09:00  10/29/19 09:31





  Lasix  PO   Not Given





  [facility]   





     





     





     





     


 


Lisinopril  2.5 mg  10/27/19 09:00  10/29/19 09:31





  Zestril  PO   Not Given





  [facility]   





     





     





     





     


 


Nicotine  21 mg  10/17/19 09:00  10/29/19 09:30





  Nicoderm Patch  TD   21 mg





  DAILY [facility]   Administration





     





     





     





     


 


Potassium Chloride  10 meq  10/24/19 17:00  10/29/19 09:30





  Klor-Con  PER TUBE   Not Given





  BID-WM G[facility]ELLE   





     





     





     





     


 


Sodium Chloride  10 ml  10/17/19 21:00  10/29/19 09:32





  Flush - Normal Saline  IVF   10 ml





  Q12HR G[facility]ELLE   Administration





     





     





     





     


 


Tramadol HCl  100 mg  10/27/19 15:36  10/29/19 02:22





  Ultram  PO   100 mg





  Q6H PRN   Administration





  Mild-Moderate Pain (1-5)   





     





     





     














- Exam


General Appearance: NAD, awake alert, ill appearing


Eye: PERRL, anicteric sclera, scleral icterus


ENT: normocephalic atraumatic, no oropharyngeal lesions, moist mucosa, dry oral 

mucosa


Neck: supple, symmetric, no JVD, no thyromegaly, no lymphadenopathy, no carotid 

bruit, JVD


Heart: RRR, no murmur, no gallops, no rubs, normal peripheral pulses, irregular

, diminshed peripheral pulses, murmur present, II/IV, III/IV


Respiratory: CTAB, no wheezes, no rales, no ronchi, normal chest expansion, no 

tachypnea, normal percussion, rales, rhonchi, tachypneic, wheezes


Gastrointestinal: soft, non-tender, non-distended, normal bowel sounds, no 

palpable masses, no hepatomegaly, no splenomegaly, no bruit, no guarding, no 

rigidity, tender to palpation, distended, diminished bowl sounds, voluntary 

guarding


Extremities: no cyanosis, no clubbing, no edema, 1+ LE edema, 2+ LE edema, 

clubbing





Hosp A/P


(1) Acute cerebrovascular accident (CVA) of cerebellum


Code(s): I63.9 - CEREBRAL INFARCTION, UNSPECIFIED   Status: Acute   





(2) Acute combined systolic and diastolic heart failure


Code(s): I50.41 - ACUTE COMBINED SYSTOLIC AND DIASTOLIC (CONGESTIVE) HRT FAIL   

Status: Acute   





- Plan


@d exho report reviewd, await the cardiology input billy the patient nededs 

anti coagulation

## 2019-10-29 NOTE — PDOC.CPN
- Subjective


Date: 10/29/19


Time: 13:30


Interval history: 





She is more confused today than yesterday. No other new issues. 





- Review of Systems


ROS unobtainable: due to mental status





- Objective


Allergies/Adverse Reactions: 


 Allergies











Allergy/AdvReac Type Severity Reaction Status Date / Time


 


diphenhydramine Allergy   Verified 04/28/18 23:35





[facility]     











Visit Medications: 


 Current Medications





Acetaminophen (Tylenol)  650 mg PO Q4H PRN


   PRN Reason: Fever > 101


   Last Admin: 10/29/19 17:13 Dose:  650 mg


Albuterol/Ipratropium (Duoneb)  3 ml NEB Q6H PRN


   PRN Reason: .WHEEZING


   Last Admin: 10/23/19 05:09 Dose:  3 ml


Lipase/Protease/Amylase ([place] [id number])  1 cap FS .PER PROTOCOL PRN


   PRN Reason: TUBE OCCLUSION PROTOCOL


Apixaban (Eliquis)  5 mg PO BID [facility]


Aspirin (Ecotrin)  325 mg PO DAILY [facility]


   Last Admin: 10/29/19 09:31 Dose:  Not Given


Atorvastatin Calcium (Lipitor)  40 mg PO HS [facility]


   Last Admin: 10/28/19 21:32 Dose:  40 mg


Ciprofloxacin (Cipro)  500 mg PO 0600,2000 [facility]


   Last Admin: 10/29/19 06:49 Dose:  Not Given


Furosemide (Lasix)  20 mg PO DAILY [facility]


   Last Admin: 10/29/19 09:31 Dose:  Not Given


Lisinopril (Zestril)  2.5 mg PO DAILY [facility]


   Last Admin: 10/29/19 09:31 Dose:  Not Given


Nicotine (Nicoderm Patch)  21 mg TD DAILY [facility]


   Last Admin: 10/29/19 09:30 Dose:  21 mg


Potassium Chloride (Klor-Con)  10 meq PER TUBE BID-WM [facility]


   Last Admin: 10/29/19 17:12 Dose:  10 meq


Sodium Chloride (Flush - Normal Saline)  10 ml IVF Q12HR [facility]


   Last Admin: 10/29/19 09:32 Dose:  10 ml


Sodium Chloride (Flush - Normal Saline)  10 ml IVF PRN PRN


   PRN Reason: Saline Flush








Vital Signs & Weight: 


 Vital Signs











  Temp Pulse Resp BP BP BP Pulse Ox


 


 10/29/19 19:54  98.1 F  91  16    152/65 H  95


 


 10/29/19 16:08  97.8 F  78  16   141/65 H   98


 


 10/29/19 12:00  97.9 F  76  12   133/77   97


 


 10/29/19 09:31   89   155/72 H   


 


 10/29/19 08:37  97.8 F  89  16   111/49 L   97








 











Admit Weight                   128 lb


 


Weight                         128 lb 11.999 oz

















- Physical Exam


General: no apparent distress


HEENT: mucus membranes moist, normocephaly


Neck: supple neck, midline trachea


Cardiac: regular rate and rhythm, no murmur


Lungs: clear to auscultation


Neuro: no lateralizing findings


Abdomen: active bowel sounds


Extremities: no edema


Skin: clear


Musculoskeletal: no pain





- Labs


Result Diagrams: 


 10/29/19 03:30





 10/26/19 03:23


 Troponin/CKMB











CK-MB (CK-2)  9.7 ng/mL (0-6.6)  H*  10/15/19  17:51    


 


Troponin I  1.302 ng/mL (< 0.028)  H*  10/15/19  21:30    














- Telemetry


Sinus rhythms and dysrhythmias: sinus rhythm





- Assessment/Plan


Assessment/Plan: 





1. Hypotension, resolved.


2. Acute on chronic systolic heart failure. 


3. ESRD


4. Ischemic CM


5. EF at 20-25%


6. Abdominal pain/distention, improved.


7. Elevated LFT's. 


8. Fecal impaction.


9. WCT likely Atrial tach vs SVT. 


10. NSTEMI s/p stent to LCx. JACI. 








PLAN:


- LV function normalized on echo. Still has a small area of dyskinesis at the 

apex suggestive of an old infarct in this distribution. Unlikely to develop a 

thrombus with such hyperdynamic LV at this time but certainly the possibility 

of having formed one in the last few days and now that her LV is back to normal 

it may have dislodged and embolized to the brain. Will plan on doing full 

anticoagulation for a total of 3 months. Will start Eliquis 5 mg BID.

## 2019-10-30 LAB
BASOPHILS # BLD AUTO: 0 THOU/UL (ref 0–0.2)
BASOPHILS NFR BLD AUTO: 0.2 % (ref 0–1)
EOSINOPHIL # BLD AUTO: 0.1 THOU/UL (ref 0–0.7)
EOSINOPHIL NFR BLD AUTO: 1.4 % (ref 0–10)
HGB BLD-MCNC: 9.8 G/DL (ref 12–16)
LYMPHOCYTES # BLD: 0.7 THOU/UL (ref 1.2–3.4)
LYMPHOCYTES NFR BLD AUTO: 13.1 % (ref 21–51)
MCH RBC QN AUTO: 31.8 PG (ref 27–31)
MCV RBC AUTO: 95.5 FL (ref 78–98)
MONOCYTES # BLD AUTO: 0.5 THOU/UL (ref 0.11–0.59)
MONOCYTES NFR BLD AUTO: 7.9 % (ref 0–10)
NEUTROPHILS # BLD AUTO: 4.4 THOU/UL (ref 1.4–6.5)
NEUTROPHILS NFR BLD AUTO: 77.3 % (ref 42–75)
PLATELET # BLD AUTO: 277 THOU/UL (ref 130–400)
RBC # BLD AUTO: 3.08 MILL/UL (ref 4.2–5.4)
WBC # BLD AUTO: 5.6 THOU/UL (ref 4.8–10.8)

## 2019-10-30 RX ADMIN — Medication SCH ML: at 21:53

## 2019-10-30 RX ADMIN — CIPROFLOXACIN HYDROCHLORIDE SCH MG: 250 TABLET, FILM COATED ORAL at 05:37

## 2019-10-30 RX ADMIN — Medication SCH ML: at 09:34

## 2019-10-30 NOTE — PDOC.CPN
- Subjective


Date: 10/30/19


Time: 18:20


Interval history: 





No new issues. Remains with intermittent confusion. 





- Review of Systems


General: denies: fever/chills, weight/appetite/sleep changes, night sweats, 

fatigue


Respiratory: denies: cough, congestion, shortness of breath, exercise 

intolerance


Cardiovascular: denies: chest pain, palpitation, edema, paroxysmal nocturnal 

dyspnea, orthopnea


Gastrointestinal: denies: nausea, vomiting, diarrhea, constipation, abd pain, 

GI bleeding


Musculoskeletal: denies: pain, tenderness, stiffness, swelling, arthritis/

arthralgias


Neurological: denies: numbness, syncope, seizure, weakness





- Objective


Allergies/Adverse Reactions: 


 Allergies











Allergy/AdvReac Type Severity Reaction Status Date / Time


 


diphenhydramine Allergy   Verified 04/28/18 23:35





[From Benadryl]     











Visit Medications: 


 Current Medications





Acetaminophen (Tylenol)  650 mg PO Q4H PRN


   PRN Reason: Fever > 101


   Last Admin: 10/29/19 17:13 Dose:  650 mg


Albuterol/Ipratropium (Duoneb)  3 ml NEB Q6H PRN


   PRN Reason: .WHEEZING


   Last Admin: 10/23/19 05:09 Dose:  3 ml


Amlodipine Besylate (Norvasc)  [facility]AM [facility]


Lipase/Protease/Amylase ([place] [id number])  1 cap FS .PER PROTOCOL PRN


   PRN Reason: TUBE OCCLUSION PROTOCOL


Apixaban (Eliquis)  5 mg PO BID [facility]


   Last Admin: 10/30/19 09:34 Dose:  5 mg


Aspirin (Ecotrin)  325 mg PO DAILY [facility]


   Last Admin: 10/30/19 09:34 Dose:  325 mg


Atorvastatin Calcium (Lipitor)  40 mg PO HS [facility]


   Last Admin: 10/29/19 21:16 Dose:  40 mg


Furosemide (Lasix)  20 mg PO DAILY [facility]


   Last Admin: 10/30/19 09:34 Dose:  20 mg


Lisinopril (Zestril)  2.5 mg PO DAILY [facility]


   Last Admin: 10/30/19 09:33 Dose:  2.5 mg


Nicotine (Nicoderm Patch)  21 mg TD DAILY [facility]


   Last Admin: 10/30/19 09:34 Dose:  21 mg


Potassium Chloride (Klor-Con)  10 meq PER TUBE BID-[facility]


   Last Admin: 10/30/19 17:59 Dose:  10 meq


Sodium Chloride (Flush - Normal Saline)  10 ml IVF Q12HR [facility]


   Last Admin: 10/30/19 09:34 Dose:  10 ml


Sodium Chloride (Flush - Normal Saline)  10 ml IVF PRN PRN


   PRN Reason: Saline Flush








Vital Signs & Weight: 


 Vital Signs











  Temp Pulse Pulse Pulse Resp BP BP


 


 10/30/19 16:00  97.9 F  99    16  


 


 10/30/19 12:00  98.1 F  81    20  


 


 10/30/19 10:23    102 H  104 H   135/59 L  132/72


 


 10/30/19 09:33   72     


 


 10/30/19 08:04  98.8 F  100    14  














  BP Pulse Ox


 


 10/30/19 16:00  145/69 H  98


 


 10/30/19 12:00  135/59 L  96


 


 10/30/19 10:23  


 


 10/30/19 09:33  


 


 10/30/19 08:04  145/68 H  98








 











Admit Weight                   128 lb


 


Weight                         128 lb 11.999 oz

















- Physical Exam


General: no apparent distress


HEENT: mucus membranes moist


Neck: supple neck


Cardiac: regular rate and rhythm


Lungs: clear to auscultation


Neuro: no lateralizing findings


Abdomen: non-tender


Extremities: no edema


Musculoskeletal: no pain





- Labs


Result Diagrams: 


 10/30/19 05:01





 10/26/19 03:23


 Troponin/CKMB











CK-MB (CK-2)  9.7 ng/mL (0-6.6)  H*  10/15/19  17:51    


 


Troponin I  1.302 ng/mL (< 0.028)  H*  10/15/19  21:30    














- Telemetry


Sinus rhythms and dysrhythmias: sinus rhythm





- Assessment/Plan


Assessment/Plan: 








1. AMS, resolved.


2. Type 2 demand ischemia. 


3. Septic shock


4. UTI sepsis. 


5. Anemia


6. Normalized LV Function with very mild apical akinesis. 


7. Acute CVA








PLAN:


- Tolerating Eliquis well. 


- Continue for next 3 months.


- Will sign off, please call with any questions.

## 2019-10-30 NOTE — PDOC.PALCO
Palliative Care Consult





- Consult Details


Requesting Physician: Dr Paulino


Reason for Consult: goals of care, advance directives assistance


Family Members Present: None





- Pertinent HPI





69 year old female who lives independently in a mobile home with her dog "Valentin 

Girl". No longer drives and her sister Kylah takes her on errands once a week, 

she also states she has a ramp to get into and out of her home. Patient was 

taken to the emergency room for evaluation of altered mental status of unknown 

time frame. Emergency room evaluation ruled out any acute neurological findings

, however patient was found to be septic secondary to a UTI and admitted to CCU 

for medical management. 





- Pertinent PMH





Hypertension, degenerative disk disease





- Social History


Smoking Status: Current every day smoker


Smoking: cigarettes


Alcohol Use: rarely


Drug Use History: none


Living Situation: independent





- Medications


MAR Reviewed: Yes





- Allergies


Allergies/Adverse Reactions: 


 Allergies











Allergy/AdvReac Type Severity Reaction Status Date / Time


 


diphenhydramine Allergy   Verified 18 23:35





[From Benadryl]     














- Objective


Vital Signs: 


 Vital Signs - Most Recent











Temp Pulse Resp BP Pulse Ox


 


 98.4 F   72   16   153/67 H  96 


 


 10/30/19 03:37  10/30/19 03:37  10/30/19 03:37  10/30/19 04:41  10/30/19 03:37











Palliative Performance Scale: 40





- Advance Directives


Medical Power of : None as of yet, however Ms Martinez has identified her 

son Jamari Martinez primary





- Physical Exam


Constitutional: confusion


Deviation from normal: fair hygiene, poor dentition, disheveled


HEENT: moist MMs, sclera anicteric


Deviation from normal: Poor dentition


Deviation from normal: wet cough


Cardiovascular: RRR


Deviation from normal: Cyanotic appearance to feet bilaterally when dependent


Gastrointestinal: soft, non-tender, positive bowel sounds


Musculoskeletal: no edema, pulses present


Neurological: moves all 4 limbs


Psychiatric: normal affect


Skin: cap refill <2 seconds


Deviation from normal: fair turgor, fragile skin with healing bruises





- Problem List


(1) Palliative care encounter


Code(s): Z51.5 - ENCOUNTER FOR PALLIATIVE CARE   Current Visit: Yes   Status: 

Acute   





(2) Acute combined systolic and diastolic heart failure


Code(s): I50.41 - ACUTE COMBINED SYSTOLIC AND DIASTOLIC (CONGESTIVE) HRT FAIL   

Current Visit: Yes   Status: Acute   





(3) Acute metabolic encephalopathy


Code(s): G93.41 - METABOLIC ENCEPHALOPATHY   Current Visit: Yes   Status: Acute

   





(4) Moderate protein-calorie malnutrition


Code(s): E44.0 - MODERATE PROTEIN-CALORIE MALNUTRITION   Current Visit: Yes   

Status: Acute   





(5) Physical deconditioning


Code(s): R53.81 - OTHER MALAISE   Current Visit: Yes   Status: Acute   





- Plan/Recommendations


Plan: Lengthy conversation with patient in relation to what is important to 

her. Lives independently, most important to her is her dog "Valentin Girl" Has three 

children Tiff Melton and a son who  tragically in .  Although patient 

is oriented to person, place, time and history there is a delay in thought 

pattern paired with intermittent confusion. Unclear if she utilizes a walker, 

incontinent of bowel and bladder. Able to feed self, however requires 

assistance for ADL's.





*Identified Geronimo Martinez as primary contact for MPOA


*Kylah Haynes Sister as second MPOA


*Establish family meeting to discuss DNAR


* In family meeting also address goals of care related to discharge and 

increased needs for support for patient





Communicated with JUSTIN De La Rosa RN to coordinate care for patient. 























[60] minutes spent on this encounter with >50% of the time in counseling and 

coordination of care.





Thank you for this very appropriate consult.

## 2019-10-30 NOTE — DIS
DATE OF ADMISSION:  10/15/2019



DATE OF DISCHARGE:  



ADMITTING DIAGNOSES:  

1. Cerebrovascular accident. 

2. Metabolic encephalopathy.



DISCHARGE DIAGNOSIS:  

1. Cerebrovascular accident, improving.  

2. Apical akinesis requiring anticoagulation as per recommendations of Cardiology.



CONSULTED ON THE CASE:  Cardiology, Dr. Newton and Dr. Marquez.



HOSPITAL COURSE:  The patient was hospitalized, observed closely and physical

therapy and outpatient therapy were continued.  As the patient's condition slowly

started improving, it has been concluded to transfer the patient out.  However, the

patient was on hold to obtain a 2D echocardiogram to see if the apical akinesis is

there, so that we could start anticoagulation and echocardiogram confirmed the

apical akinesis.  As a result, the patient was placed on Eliquis 5 mg b.i.d. for 2

months as per the recommendations of cardiologist.  Followup care has been arranged. 



DISCHARGE INSTRUCTIONS:  Discharge the patient to outpatient care.



DIET:  Cardiac prudent.



ACTIVITY:  As tolerated.



DISCHARGE MEDICATIONS:  As per reconciliation sheet.



FOLLOWUP CARE:  Follow up with cardiologist and PCP as scheduled.







Job ID:  [id number]

## 2019-10-31 VITALS — SYSTOLIC BLOOD PRESSURE: 128 MMHG | TEMPERATURE: 97.5 F | DIASTOLIC BLOOD PRESSURE: 61 MMHG

## 2019-10-31 LAB
BASOPHILS # BLD AUTO: 0 THOU/UL (ref 0–0.2)
BASOPHILS NFR BLD AUTO: 0.6 % (ref 0–1)
EOSINOPHIL # BLD AUTO: 0.1 THOU/UL (ref 0–0.7)
EOSINOPHIL NFR BLD AUTO: 2 % (ref 0–10)
HGB BLD-MCNC: 11.7 G/DL (ref 12–16)
LYMPHOCYTES # BLD: 1 THOU/UL (ref 1.2–3.4)
LYMPHOCYTES NFR BLD AUTO: 31.1 % (ref 21–51)
MCH RBC QN AUTO: 30.7 PG (ref 27–31)
MCV RBC AUTO: 95 FL (ref 78–98)
MONOCYTES # BLD AUTO: 0.4 THOU/UL (ref 0.11–0.59)
MONOCYTES NFR BLD AUTO: 13.2 % (ref 0–10)
NEUTROPHILS # BLD AUTO: 1.8 THOU/UL (ref 1.4–6.5)
NEUTROPHILS NFR BLD AUTO: 53 % (ref 42–75)
PLATELET # BLD AUTO: 341 THOU/UL (ref 130–400)
RBC # BLD AUTO: 3.81 MILL/UL (ref 4.2–5.4)
WBC # BLD AUTO: 3.3 THOU/UL (ref 4.8–10.8)

## 2019-10-31 RX ADMIN — Medication SCH: at 09:41

## 2019-10-31 NOTE — DIS
DATE OF ADMISSION:  10/15/2019



DATE OF DISCHARGE:  



ADDENDUM:  



DISCHARGE OF POSSIBLE DISCHARGE:  10/30/2019.



FINAL DIAGNOSIS:  Possible apical thrombus, requiring anticoagulation for one month

as per Cardiology. 



CONSULTANTS ON THE CASE:  Cardiology, Dr. Yanes.



HOSPITAL COURSE:  The patient had been admitted.  Physical therapy was continued.

The patient underwent 2D echocardiogram, confirmed a possible apical hypokinesis.

As a result, cardiology recommended anticoagulation for apical thrombus needed for

30 days.  As the patient does not have enough support at home, it has been concluded

to transfer the patient to skilled nursing facility. 



DISCHARGE INSTRUCTIONS:  Discharge the patient to skilled nursing facility.



DIET:  Cardiac prudent.



ACTIVITY:  As per physical therapy recommendations.



FOLLOWUP CARE:  Follow up with PCP as scheduled and follow up with Cardiology as

scheduled, skilled nursing facility physician to be followed. 



DISCHARGE MEDICATIONS:  As per reconciliation sheet.







Job ID:  [id number]

## 2019-11-01 NOTE — DIS
DATE OF ADMISSION:  10/15/2019



DATE OF DISCHARGE:  10/31/2019



DISCHARGE DIAGNOSES:  

1. Sepsis secondary to Klebsiella pneumoniae and urinary tract infection.

2. Acute metabolic and toxic encephalopathy, improved.

3. Hypertension.

4. Degenerative joint disease.

5. Positive troponin with ejection fraction 45% to 50%, systolic and diastolic

dysfunction with wall motion abnormalities.  Apical hypokinesia, suspected apical

thrombus.  Recommended oral anticoagulation for 1 month. 

6. Pulmonary hypertension.

7. Acute CVA.

8. Mild rhabdomyolysis.

9. Moderate protein calorie malnutrition.



PHYSICAL EXAMINATION:

VITAL SIGNS:  On discharge, blood pressure 128/61, temperature 97.5, pulse 88,

respiration 14, oxygen saturation 98%. 

GENERAL:  Patient is awake and alert. 

HEENT:  Oral mucosa moist. 

NECK:  Supple.  No JVD.  No lymphadenopathy. 

CHEST:  Normal vesicular breathing. 

ABDOMEN:  Soft. 

EXTREMITIES:  Negative edema of feet.



LABORATORY DATA:  On discharge, BMP unremarkable.  CBC unremarkable except

hemoglobin 9.8.  Urine toxicology negative on admission.  Echocardiogram,

10/28/2019, ejection fraction 60% to 65%, grade 1 to 3 diastolic dysfunction, apical

akinesis.  Carotid Doppler study, no hemodynamically significant stenosis on the

basis of sonographic BLRc criteria.  MRI brain, motion limited examination

demonstrating 6 to 7 mm focus of acute infarction within the right cerebellar

vermis.  There is cerebral volume loss and small vessel disease. 



HOSPITAL SUMMARY:  Patient was admitted on 10/15/2019 with altered mental status,

very combative and questionable left-sided weakness, nystagmus.  The patient was

admitted with acute diagnosis of sepsis secondary to UTI and MRI was positive for

acute infarction.  The patient's sepsis treated with antibiotics.  The patient

admitted to CCU.  MRI brain was positive for acute infarction.  Evaluated by

Neurology.  Echo was performed which showed a normal ejection fraction and apical

hypokinesis.  Cardiology recommended to continue patient on oral anticoagulation for

1 month.  The patient completed antibiotic therapy.  Patient awake, alert, however,

has low appetite, being discharged to nursing home.  The patient currently in a

stable condition. 







Job ID:  [id number]